# Patient Record
Sex: FEMALE | Race: ASIAN | NOT HISPANIC OR LATINO | Employment: FULL TIME | URBAN - METROPOLITAN AREA
[De-identification: names, ages, dates, MRNs, and addresses within clinical notes are randomized per-mention and may not be internally consistent; named-entity substitution may affect disease eponyms.]

---

## 2017-07-14 ENCOUNTER — HOSPITAL ENCOUNTER (OUTPATIENT)
Dept: LAB | Facility: MEDICAL CENTER | Age: 37
End: 2017-07-14
Attending: FAMILY MEDICINE
Payer: COMMERCIAL

## 2017-07-14 LAB — TSH SERPL DL<=0.005 MIU/L-ACNC: 5.82 UIU/ML (ref 0.3–3.7)

## 2017-07-14 PROCEDURE — 36415 COLL VENOUS BLD VENIPUNCTURE: CPT

## 2017-07-14 PROCEDURE — 84443 ASSAY THYROID STIM HORMONE: CPT

## 2018-11-21 ENCOUNTER — OFFICE VISIT (OUTPATIENT)
Dept: MEDICAL GROUP | Facility: PHYSICIAN GROUP | Age: 38
End: 2018-11-21
Payer: COMMERCIAL

## 2018-11-21 ENCOUNTER — HOSPITAL ENCOUNTER (OUTPATIENT)
Dept: LAB | Facility: MEDICAL CENTER | Age: 38
End: 2018-11-21
Attending: FAMILY MEDICINE
Payer: COMMERCIAL

## 2018-11-21 VITALS
SYSTOLIC BLOOD PRESSURE: 110 MMHG | OXYGEN SATURATION: 99 % | HEART RATE: 73 BPM | TEMPERATURE: 97.2 F | RESPIRATION RATE: 12 BRPM | BODY MASS INDEX: 34.23 KG/M2 | HEIGHT: 66 IN | DIASTOLIC BLOOD PRESSURE: 60 MMHG | WEIGHT: 213 LBS

## 2018-11-21 DIAGNOSIS — E03.4 HYPOTHYROIDISM DUE TO ACQUIRED ATROPHY OF THYROID: ICD-10-CM

## 2018-11-21 DIAGNOSIS — Z83.3 FAMILY HISTORY OF DIABETES MELLITUS: ICD-10-CM

## 2018-11-21 DIAGNOSIS — R53.83 OTHER FATIGUE: ICD-10-CM

## 2018-11-21 LAB
25(OH)D3 SERPL-MCNC: 9 NG/ML (ref 30–100)
ALBUMIN SERPL BCP-MCNC: 4.2 G/DL (ref 3.2–4.9)
ALBUMIN/GLOB SERPL: 1.3 G/DL
ALP SERPL-CCNC: 68 U/L (ref 30–99)
ALT SERPL-CCNC: 12 U/L (ref 2–50)
ANION GAP SERPL CALC-SCNC: 7 MMOL/L (ref 0–11.9)
AST SERPL-CCNC: 15 U/L (ref 12–45)
BASOPHILS # BLD AUTO: 0.5 % (ref 0–1.8)
BASOPHILS # BLD: 0.04 K/UL (ref 0–0.12)
BILIRUB SERPL-MCNC: 0.9 MG/DL (ref 0.1–1.5)
BUN SERPL-MCNC: 10 MG/DL (ref 8–22)
CALCIUM SERPL-MCNC: 9.1 MG/DL (ref 8.5–10.5)
CHLORIDE SERPL-SCNC: 102 MMOL/L (ref 96–112)
CHOLEST SERPL-MCNC: 200 MG/DL (ref 100–199)
CO2 SERPL-SCNC: 27 MMOL/L (ref 20–33)
CREAT SERPL-MCNC: 0.58 MG/DL (ref 0.5–1.4)
EOSINOPHIL # BLD AUTO: 0.09 K/UL (ref 0–0.51)
EOSINOPHIL NFR BLD: 1.1 % (ref 0–6.9)
ERYTHROCYTE [DISTWIDTH] IN BLOOD BY AUTOMATED COUNT: 43.8 FL (ref 35.9–50)
EST. AVERAGE GLUCOSE BLD GHB EST-MCNC: 123 MG/DL
FASTING STATUS PATIENT QL REPORTED: NORMAL
FOLATE SERPL-MCNC: 10.9 NG/ML
GLOBULIN SER CALC-MCNC: 3.2 G/DL (ref 1.9–3.5)
GLUCOSE SERPL-MCNC: 88 MG/DL (ref 65–99)
HBA1C MFR BLD: 5.9 % (ref 0–5.6)
HCT VFR BLD AUTO: 41.3 % (ref 37–47)
HDLC SERPL-MCNC: 59 MG/DL
HGB BLD-MCNC: 13 G/DL (ref 12–16)
IMM GRANULOCYTES # BLD AUTO: 0.02 K/UL (ref 0–0.11)
IMM GRANULOCYTES NFR BLD AUTO: 0.3 % (ref 0–0.9)
IRON SATN MFR SERPL: 14 % (ref 15–55)
IRON SERPL-MCNC: 68 UG/DL (ref 40–170)
LDLC SERPL CALC-MCNC: 124 MG/DL
LYMPHOCYTES # BLD AUTO: 2.2 K/UL (ref 1–4.8)
LYMPHOCYTES NFR BLD: 28.1 % (ref 22–41)
MCH RBC QN AUTO: 26.7 PG (ref 27–33)
MCHC RBC AUTO-ENTMCNC: 31.5 G/DL (ref 33.6–35)
MCV RBC AUTO: 85 FL (ref 81.4–97.8)
MONOCYTES # BLD AUTO: 0.45 K/UL (ref 0–0.85)
MONOCYTES NFR BLD AUTO: 5.7 % (ref 0–13.4)
NEUTROPHILS # BLD AUTO: 5.03 K/UL (ref 2–7.15)
NEUTROPHILS NFR BLD: 64.3 % (ref 44–72)
NRBC # BLD AUTO: 0 K/UL
NRBC BLD-RTO: 0 /100 WBC
PLATELET # BLD AUTO: 330 K/UL (ref 164–446)
PMV BLD AUTO: 11.4 FL (ref 9–12.9)
POTASSIUM SERPL-SCNC: 4.1 MMOL/L (ref 3.6–5.5)
PROT SERPL-MCNC: 7.4 G/DL (ref 6–8.2)
RBC # BLD AUTO: 4.86 M/UL (ref 4.2–5.4)
SODIUM SERPL-SCNC: 136 MMOL/L (ref 135–145)
T3 SERPL-MCNC: 108.8 NG/DL (ref 60–181)
T4 FREE SERPL-MCNC: 1.04 NG/DL (ref 0.53–1.43)
TIBC SERPL-MCNC: 473 UG/DL (ref 250–450)
TRIGL SERPL-MCNC: 86 MG/DL (ref 0–149)
TSH SERPL DL<=0.005 MIU/L-ACNC: 2.88 UIU/ML (ref 0.38–5.33)
WBC # BLD AUTO: 7.8 K/UL (ref 4.8–10.8)

## 2018-11-21 PROCEDURE — 83036 HEMOGLOBIN GLYCOSYLATED A1C: CPT

## 2018-11-21 PROCEDURE — 84443 ASSAY THYROID STIM HORMONE: CPT

## 2018-11-21 PROCEDURE — 36415 COLL VENOUS BLD VENIPUNCTURE: CPT

## 2018-11-21 PROCEDURE — 84480 ASSAY TRIIODOTHYRONINE (T3): CPT

## 2018-11-21 PROCEDURE — 80053 COMPREHEN METABOLIC PANEL: CPT

## 2018-11-21 PROCEDURE — 83550 IRON BINDING TEST: CPT

## 2018-11-21 PROCEDURE — 82746 ASSAY OF FOLIC ACID SERUM: CPT

## 2018-11-21 PROCEDURE — 85025 COMPLETE CBC W/AUTO DIFF WBC: CPT

## 2018-11-21 PROCEDURE — 80061 LIPID PANEL: CPT

## 2018-11-21 PROCEDURE — 84439 ASSAY OF FREE THYROXINE: CPT

## 2018-11-21 PROCEDURE — 99204 OFFICE O/P NEW MOD 45 MIN: CPT | Performed by: FAMILY MEDICINE

## 2018-11-21 PROCEDURE — 82306 VITAMIN D 25 HYDROXY: CPT

## 2018-11-21 PROCEDURE — 83540 ASSAY OF IRON: CPT

## 2018-11-21 RX ORDER — LEVOTHYROXINE SODIUM 0.05 MG/1
TABLET ORAL
COMMUNITY
Start: 2018-10-26 | End: 2019-04-23 | Stop reason: SDUPTHER

## 2018-11-21 ASSESSMENT — ENCOUNTER SYMPTOMS
CHILLS: 0
EYES NEGATIVE: 1
NEUROLOGICAL NEGATIVE: 1
PALPITATIONS: 0
TINGLING: 0
BLURRED VISION: 0
FEVER: 0
MYALGIAS: 0
HEARTBURN: 0
DEPRESSION: 0
HEADACHES: 0
RESPIRATORY NEGATIVE: 1
DIZZINESS: 0
ABDOMINAL PAIN: 0
PSYCHIATRIC NEGATIVE: 1
HEMOPTYSIS: 0
MUSCULOSKELETAL NEGATIVE: 1
NAUSEA: 0
BRUISES/BLEEDS EASILY: 0
GASTROINTESTINAL NEGATIVE: 1
DOUBLE VISION: 0
ANOREXIA: 0
FATIGUE: 1
CARDIOVASCULAR NEGATIVE: 1
COUGH: 0

## 2018-11-21 ASSESSMENT — PATIENT HEALTH QUESTIONNAIRE - PHQ9: CLINICAL INTERPRETATION OF PHQ2 SCORE: 0

## 2018-11-21 NOTE — PROGRESS NOTES
Subjective:      Asmita Vick is a 38 y.o. female who presents with Thyroid Problem            New patient visit, has been very tired lately and no recent labs for her thyroid replacement will get labs for that and for her family history of DM    1. Hypothyroidism due to acquired atrophy of thyroid    - levothyroxine (SYNTHROID) 50 MCG Tab;   - COMP METABOLIC PANEL; Future  - FREE THYROXINE; Future  - HEMOGLOBIN A1C; Future  - Lipid Profile; Future  - TRIIDOTHYRONINE; Future  - TSH; Future  - VITAMIN D,25 HYDROXY; Future  - CBC WITH DIFFERENTIAL; Future  - IRON/TOTAL IRON BIND; Future  - FOLATE; Future    2. Other fatigue    - levothyroxine (SYNTHROID) 50 MCG Tab;   - COMP METABOLIC PANEL; Future  - FREE THYROXINE; Future  - HEMOGLOBIN A1C; Future  - Lipid Profile; Future  - TRIIDOTHYRONINE; Future  - TSH; Future  - VITAMIN D,25 HYDROXY; Future  - CBC WITH DIFFERENTIAL; Future  - IRON/TOTAL IRON BIND; Future  - FOLATE; Future    3. Family history of diabetes mellitus    - levothyroxine (SYNTHROID) 50 MCG Tab;   - COMP METABOLIC PANEL; Future  - FREE THYROXINE; Future  - HEMOGLOBIN A1C; Future  - Lipid Profile; Future  - TRIIDOTHYRONINE; Future  - TSH; Future  - VITAMIN D,25 HYDROXY; Future  - CBC WITH DIFFERENTIAL; Future  - IRON/TOTAL IRON BIND; Future  - FOLATE; Future    Past Medical History:  2/6/2013: Abnormal skin color  No date: ASTHMA      Comment:  in childhood only, none recent  2/6/2013: Family history of diabetes mellitus  No date: Thyroid disease  History reviewed. No pertinent surgical history.  Smoking status: Never Smoker                                                              Smokeless tobacco: Never Used                      Alcohol use: No              Review of patient's family history indicates:  Problem: Hypertension      Relation: Mother       Age of Onset: (Not Specified)   Problem: Diabetes      Relation: Mother       Age of Onset: (Not Specified)   Problem: Diabetes       Relation: Father       Age of Onset: (Not Specified)   Problem: Hypertension      Relation: Father       Age of Onset: (Not Specified)       Current Outpatient Prescriptions: •  levothyroxine (SYNTHROID) 50 MCG Tab, , Disp: , Rfl: •  ibuprofen (MOTRIN) 600 MG TABS, Take 1 Tab by mouth every 6 hours as needed (Cramping). (Patient not taking: Reported on 11/21/2018), Disp: 20 Tab, Rfl: 1•  acetaminophen (TYLENOL) 325 MG TABS, Take 1 Tab by mouth every four hours as needed for Mild Pain ((Pain Scale 1-3))., Disp: 30 Tab, Rfl: 0•  acetaminophen 650 MG TABS, Take 650 mg by mouth every four hours as needed for Fever (over 100.4 F)., Disp: 30 Tab, Rfl: 0•  oxycodone-acetaminophen (PERCOCET) 5-325 MG TABS, Take 1-2 Tabs by mouth every four hours as needed for Severe Pain ((Pain Scale 7-10)). (Patient not taking: Reported on 11/21/2018), Disp: 15 Tab, Rfl: 0•  Prenatal MV-Min-Fe Fum-FA-DHA (PRENATAL 1 PO), Take  by mouth., Disp: , Rfl: •  albuterol (PROVENTIL) 2.5mg/3ml NEBU, 3 mL by Nebulization route every 6 hours as needed for Shortness of Breath., Disp: 25 Bullet, Rfl: 1    Patient was instructed on the use of medications, either prescriptions or OTC and informed on when the appropriate follow up time period should be. In addition, patient was also instructed that should any acute worsening occur that they should notify this clinic asap or call 911.          Fatigue   This is a chronic problem. The current episode started more than 1 month ago. The problem occurs constantly. The problem has been waxing and waning. Associated symptoms include fatigue. Pertinent negatives include no abdominal pain, anorexia, chest pain, chills, coughing, fever, headaches, myalgias, nausea or rash. The symptoms are aggravated by exertion. She has tried rest for the symptoms. The treatment provided no relief.       Review of Systems   Constitutional: Positive for fatigue and malaise/fatigue. Negative for chills and fever.   HENT: Negative.   "Negative for hearing loss.    Eyes: Negative.  Negative for blurred vision and double vision.   Respiratory: Negative.  Negative for cough and hemoptysis.    Cardiovascular: Negative.  Negative for chest pain and palpitations.   Gastrointestinal: Negative.  Negative for abdominal pain, anorexia, heartburn and nausea.   Genitourinary: Negative.  Negative for dysuria.   Musculoskeletal: Negative.  Negative for myalgias.   Skin: Negative.  Negative for rash.   Neurological: Negative.  Negative for dizziness, tingling and headaches.   Endo/Heme/Allergies: Negative.  Does not bruise/bleed easily.   Psychiatric/Behavioral: Negative.  Negative for depression and suicidal ideas.   All other systems reviewed and are negative.         Objective:     /60 (BP Location: Left arm)   Pulse 73   Temp 36.2 °C (97.2 °F)   Resp 12   Ht 1.676 m (5' 6\")   Wt 96.6 kg (213 lb)   SpO2 99%   BMI 34.38 kg/m²      Physical Exam   Constitutional: She is oriented to person, place, and time. She appears well-developed and well-nourished. No distress.   HENT:   Head: Normocephalic and atraumatic.   Mouth/Throat: Oropharynx is clear and moist. No oropharyngeal exudate.   Eyes: Pupils are equal, round, and reactive to light.   Cardiovascular: Normal rate, regular rhythm, normal heart sounds and intact distal pulses.  Exam reveals no gallop and no friction rub.    No murmur heard.  Pulmonary/Chest: Effort normal and breath sounds normal. No respiratory distress. She has no wheezes. She has no rales. She exhibits no tenderness.   Neurological: She is alert and oriented to person, place, and time.   Skin: She is not diaphoretic.   Psychiatric: She has a normal mood and affect. Her behavior is normal. Judgment and thought content normal.   Nursing note and vitals reviewed.              Assessment/Plan:     1. Hypothyroidism due to acquired atrophy of thyroid    - levothyroxine (SYNTHROID) 50 MCG Tab;   - COMP METABOLIC PANEL; Future  - " FREE THYROXINE; Future  - HEMOGLOBIN A1C; Future  - Lipid Profile; Future  - TRIIDOTHYRONINE; Future  - TSH; Future  - VITAMIN D,25 HYDROXY; Future  - CBC WITH DIFFERENTIAL; Future  - IRON/TOTAL IRON BIND; Future  - FOLATE; Future    2. Other fatigue    - levothyroxine (SYNTHROID) 50 MCG Tab;   - COMP METABOLIC PANEL; Future  - FREE THYROXINE; Future  - HEMOGLOBIN A1C; Future  - Lipid Profile; Future  - TRIIDOTHYRONINE; Future  - TSH; Future  - VITAMIN D,25 HYDROXY; Future  - CBC WITH DIFFERENTIAL; Future  - IRON/TOTAL IRON BIND; Future  - FOLATE; Future    3. Family history of diabetes mellitus    - levothyroxine (SYNTHROID) 50 MCG Tab;   - COMP METABOLIC PANEL; Future  - FREE THYROXINE; Future  - HEMOGLOBIN A1C; Future  - Lipid Profile; Future  - TRIIDOTHYRONINE; Future  - TSH; Future  - VITAMIN D,25 HYDROXY; Future  - CBC WITH DIFFERENTIAL; Future  - IRON/TOTAL IRON BIND; Future  - FOLATE; Future

## 2018-11-26 ENCOUNTER — TELEPHONE (OUTPATIENT)
Dept: MEDICAL GROUP | Facility: PHYSICIAN GROUP | Age: 38
End: 2018-11-26

## 2018-11-26 NOTE — TELEPHONE ENCOUNTER
----- Message from Mahamed Abebe M.D. sent at 11/26/2018  8:12 AM PST -----  This patient needs an appointment within the next week. Please schedule this with the patient so we may discuss their lab results

## 2018-11-29 ENCOUNTER — OFFICE VISIT (OUTPATIENT)
Dept: MEDICAL GROUP | Facility: PHYSICIAN GROUP | Age: 38
End: 2018-11-29
Payer: COMMERCIAL

## 2018-11-29 VITALS
SYSTOLIC BLOOD PRESSURE: 104 MMHG | HEIGHT: 66 IN | OXYGEN SATURATION: 99 % | HEART RATE: 90 BPM | BODY MASS INDEX: 34.87 KG/M2 | WEIGHT: 217 LBS | RESPIRATION RATE: 16 BRPM | DIASTOLIC BLOOD PRESSURE: 70 MMHG | TEMPERATURE: 97.3 F

## 2018-11-29 DIAGNOSIS — E55.9 VITAMIN D DEFICIENCY: ICD-10-CM

## 2018-11-29 DIAGNOSIS — E74.39 GLUCOSE INTOLERANCE: ICD-10-CM

## 2018-11-29 PROCEDURE — 99213 OFFICE O/P EST LOW 20 MIN: CPT | Performed by: FAMILY MEDICINE

## 2018-11-30 NOTE — PROGRESS NOTES
Over 50% of this 15 minute visit was spent on counseling and coordination of care regarding the patient's current problem of   1. Glucose intolerance  Due to the patient's issues with glucose management, today they were extensively counseled on a low carb diet and exercise and losing weight    - VITAMIN D,25 HYDROXY; Future  - HEMOGLOBIN A1C; Future  - COMP METABOLIC PANEL; Future  - Lipid Profile; Future    2. Vitamin D deficiency  Labs show patient is low on vitamin d, they needs to replace this with 2000 units per day otc    - VITAMIN D,25 HYDROXY; Future  - HEMOGLOBIN A1C; Future  - COMP METABOLIC PANEL; Future  - Lipid Profile; Future    Past Medical History:   Diagnosis Date   • Abnormal skin color 2/6/2013   • ASTHMA     in childhood only, none recent   • Family history of diabetes mellitus 2/6/2013   • Thyroid disease      No past surgical history on file.  Social History   Substance Use Topics   • Smoking status: Never Smoker   • Smokeless tobacco: Never Used   • Alcohol use No     Family History   Problem Relation Age of Onset   • Hypertension Mother    • Diabetes Mother    • Diabetes Father    • Hypertension Father          Current Outpatient Prescriptions:   •  levothyroxine (SYNTHROID) 50 MCG Tab, , Disp: , Rfl:   •  ibuprofen (MOTRIN) 600 MG TABS, Take 1 Tab by mouth every 6 hours as needed (Cramping). (Patient not taking: Reported on 11/21/2018), Disp: 20 Tab, Rfl: 1  •  acetaminophen (TYLENOL) 325 MG TABS, Take 1 Tab by mouth every four hours as needed for Mild Pain ((Pain Scale 1-3))., Disp: 30 Tab, Rfl: 0  •  acetaminophen 650 MG TABS, Take 650 mg by mouth every four hours as needed for Fever (over 100.4 F)., Disp: 30 Tab, Rfl: 0  •  oxycodone-acetaminophen (PERCOCET) 5-325 MG TABS, Take 1-2 Tabs by mouth every four hours as needed for Severe Pain ((Pain Scale 7-10)). (Patient not taking: Reported on 11/21/2018), Disp: 15 Tab, Rfl: 0  •  Prenatal MV-Min-Fe Fum-FA-DHA (PRENATAL 1 PO), Take  by  mouth., Disp: , Rfl:   •  albuterol (PROVENTIL) 2.5mg/3ml NEBU, 3 mL by Nebulization route every 6 hours as needed for Shortness of Breath., Disp: 25 Bullet, Rfl: 1    Patient was instructed on the use of medications, either prescriptions or OTC and informed on when the appropriate follow up time period should be. In addition, patient was also instructed that should any acute worsening occur that they should notify this clinic asap or call 911.

## 2019-04-23 ENCOUNTER — HOSPITAL ENCOUNTER (OUTPATIENT)
Dept: LAB | Facility: MEDICAL CENTER | Age: 39
End: 2019-04-23
Attending: FAMILY MEDICINE
Payer: COMMERCIAL

## 2019-04-23 DIAGNOSIS — E74.39 GLUCOSE INTOLERANCE: ICD-10-CM

## 2019-04-23 DIAGNOSIS — Z83.3 FAMILY HISTORY OF DIABETES MELLITUS: ICD-10-CM

## 2019-04-23 DIAGNOSIS — E55.9 VITAMIN D DEFICIENCY: ICD-10-CM

## 2019-04-23 DIAGNOSIS — R53.83 OTHER FATIGUE: ICD-10-CM

## 2019-04-23 DIAGNOSIS — E03.4 HYPOTHYROIDISM DUE TO ACQUIRED ATROPHY OF THYROID: ICD-10-CM

## 2019-04-23 LAB
25(OH)D3 SERPL-MCNC: 33 NG/ML (ref 30–100)
ALBUMIN SERPL BCP-MCNC: 4.4 G/DL (ref 3.2–4.9)
ALBUMIN/GLOB SERPL: 1.4 G/DL
ALP SERPL-CCNC: 59 U/L (ref 30–99)
ALT SERPL-CCNC: 10 U/L (ref 2–50)
ANION GAP SERPL CALC-SCNC: 8 MMOL/L (ref 0–11.9)
AST SERPL-CCNC: 16 U/L (ref 12–45)
BILIRUB SERPL-MCNC: 1.2 MG/DL (ref 0.1–1.5)
BUN SERPL-MCNC: 10 MG/DL (ref 8–22)
CALCIUM SERPL-MCNC: 9.4 MG/DL (ref 8.5–10.5)
CHLORIDE SERPL-SCNC: 102 MMOL/L (ref 96–112)
CHOLEST SERPL-MCNC: 191 MG/DL (ref 100–199)
CO2 SERPL-SCNC: 27 MMOL/L (ref 20–33)
CREAT SERPL-MCNC: 0.72 MG/DL (ref 0.5–1.4)
FASTING STATUS PATIENT QL REPORTED: NORMAL
GLOBULIN SER CALC-MCNC: 3.1 G/DL (ref 1.9–3.5)
GLUCOSE SERPL-MCNC: 88 MG/DL (ref 65–99)
HDLC SERPL-MCNC: 54 MG/DL
LDLC SERPL CALC-MCNC: 119 MG/DL
POTASSIUM SERPL-SCNC: 4.6 MMOL/L (ref 3.6–5.5)
PROT SERPL-MCNC: 7.5 G/DL (ref 6–8.2)
SODIUM SERPL-SCNC: 137 MMOL/L (ref 135–145)
TRIGL SERPL-MCNC: 92 MG/DL (ref 0–149)

## 2019-04-23 PROCEDURE — 80053 COMPREHEN METABOLIC PANEL: CPT

## 2019-04-23 PROCEDURE — 36415 COLL VENOUS BLD VENIPUNCTURE: CPT

## 2019-04-23 PROCEDURE — 83036 HEMOGLOBIN GLYCOSYLATED A1C: CPT

## 2019-04-23 PROCEDURE — 82306 VITAMIN D 25 HYDROXY: CPT

## 2019-04-23 PROCEDURE — 80061 LIPID PANEL: CPT

## 2019-04-23 RX ORDER — LEVOTHYROXINE SODIUM 0.05 MG/1
50 TABLET ORAL
Qty: 90 TAB | Refills: 0 | Status: SHIPPED | OUTPATIENT
Start: 2019-04-23 | End: 2021-03-10

## 2019-04-24 LAB
EST. AVERAGE GLUCOSE BLD GHB EST-MCNC: 123 MG/DL
HBA1C MFR BLD: 5.9 % (ref 0–5.6)

## 2019-05-08 ENCOUNTER — GYNECOLOGY VISIT (OUTPATIENT)
Dept: OBGYN | Facility: MEDICAL CENTER | Age: 39
End: 2019-05-08
Payer: COMMERCIAL

## 2019-05-08 VITALS — WEIGHT: 213 LBS | BODY MASS INDEX: 34.38 KG/M2 | DIASTOLIC BLOOD PRESSURE: 68 MMHG | SYSTOLIC BLOOD PRESSURE: 108 MMHG

## 2019-05-08 DIAGNOSIS — N64.4 MASTALGIA: ICD-10-CM

## 2019-05-08 DIAGNOSIS — Z12.39 BREAST CANCER SCREENING: ICD-10-CM

## 2019-05-08 PROCEDURE — 99203 OFFICE O/P NEW LOW 30 MIN: CPT | Performed by: OBSTETRICS & GYNECOLOGY

## 2019-05-09 NOTE — PROGRESS NOTES
Subjective:      Asmita Vick is a 38 y.o. female who presents with No chief complaint on file.    CC: boils in vaginal area        HPI: 39 yo  lmp 19 using condoms for contraception presents with complaint of boils in the vaginal area x 2 months.  She states they are not big, less than a quarter size, and itchy.  No pain or vaginal discharge.  Denies hx of abnormal pap smears or stds.  Denies dysuria.  She is currently on her menses and does not want to have a pelvic exam.  She also complains of left sided breast pain, which has been present for several months and is localized to the inferior aspect of her left breast. She describes the pain as low grade and constant. Denies skin changes, nipple discharge, or palpable mass.  She drinks about 4 cups of tea per day. No family hx of breast cancer.     ROS REVIEW OF SYSTEMS:    Pertinent positives and negatives mentioned in HPI.    All other systems reviewed and are negative or noncontributory.       Objective:     /68   Wt 96.6 kg (213 lb)   LMP 2019 (Exact Date)   Breastfeeding? Unknown   BMI 34.38 kg/m²      Physical Exam      GENERAL: Alert, in no apparent distress  PSYCHIATRIC: Appropriate affect, intact insight and judgement.  NECK:  Nontender, no masses.  No Thyromegaly or nodules. No lymphadenopathy.  RESPIRATORY: Normal respiratory effort.  Lungs clear to auscultation.   CARDIOVASCULAR: RRR, no murmur, gallop, or rub.  ABDOMEN: Soft, nontender, nondistended.  No palpable masses.  No rebound or guarding.  No inguinal lymphadenopathy.  No hepatosplenomegaly.  No hernias.  BACK: No CVA tenderness  EXTREMITIES: No edema  SKIN: No rash    BREAST: No masses or tenderness.  No skin changes.  No nipple inversion or discharge. No axillary lymphadenopathy.      GENITOURINARY: refuses pelvic exam today because she is on her menses.     Assessment/Plan:     1. Mastalgia  Normal breast exam, but given unilateral pain, will obtain  mammogram.  Discussed reducing caffeine intake, and symptomatic treatment with Motrin and oil of evening primrose.   - MA-SCREENING MAMMO BILAT W/TOMOSYNTHESIS W/CAD; Future    2. Breast cancer screening  - MA-SCREENING MAMMO BILAT W/TOMOSYNTHESIS W/CAD; Future    F/U 2 weeks for annual exam and evaluation of vaginal boils.

## 2019-05-24 ENCOUNTER — APPOINTMENT (OUTPATIENT)
Dept: OBGYN | Facility: MEDICAL CENTER | Age: 39
End: 2019-05-24
Payer: COMMERCIAL

## 2019-05-24 ENCOUNTER — HOSPITAL ENCOUNTER (OUTPATIENT)
Dept: RADIOLOGY | Facility: MEDICAL CENTER | Age: 39
End: 2019-05-24
Attending: OBSTETRICS & GYNECOLOGY
Payer: COMMERCIAL

## 2019-05-24 DIAGNOSIS — Z12.39 BREAST CANCER SCREENING: ICD-10-CM

## 2019-05-24 DIAGNOSIS — N64.4 MASTALGIA: ICD-10-CM

## 2019-05-24 PROCEDURE — G0279 TOMOSYNTHESIS, MAMMO: HCPCS

## 2019-05-24 PROCEDURE — 76642 ULTRASOUND BREAST LIMITED: CPT | Mod: RT

## 2019-05-30 ENCOUNTER — HOSPITAL ENCOUNTER (OUTPATIENT)
Dept: LAB | Facility: MEDICAL CENTER | Age: 39
End: 2019-05-30
Attending: SPECIALIST
Payer: COMMERCIAL

## 2019-05-30 PROCEDURE — 87086 URINE CULTURE/COLONY COUNT: CPT

## 2019-06-01 LAB
BACTERIA UR CULT: NORMAL
SIGNIFICANT IND 70042: NORMAL
SITE SITE: NORMAL
SOURCE SOURCE: NORMAL

## 2020-05-27 ENCOUNTER — TELEMEDICINE (OUTPATIENT)
Dept: MEDICAL GROUP | Facility: PHYSICIAN GROUP | Age: 40
End: 2020-05-27
Payer: COMMERCIAL

## 2020-05-27 VITALS — WEIGHT: 200 LBS | BODY MASS INDEX: 32.28 KG/M2

## 2020-05-27 DIAGNOSIS — S46.812A TRAPEZIUS STRAIN, LEFT, INITIAL ENCOUNTER: ICD-10-CM

## 2020-05-27 DIAGNOSIS — E74.39 GLUCOSE INTOLERANCE: ICD-10-CM

## 2020-05-27 DIAGNOSIS — E03.4 HYPOTHYROIDISM DUE TO ACQUIRED ATROPHY OF THYROID: ICD-10-CM

## 2020-05-27 PROCEDURE — 99214 OFFICE O/P EST MOD 30 MIN: CPT | Mod: 95,CR | Performed by: FAMILY MEDICINE

## 2020-05-27 RX ORDER — TIZANIDINE 4 MG/1
4 TABLET ORAL NIGHTLY PRN
Qty: 30 TAB | Refills: 1 | Status: SHIPPED | OUTPATIENT
Start: 2020-05-27 | End: 2021-03-10

## 2020-05-27 ASSESSMENT — FIBROSIS 4 INDEX: FIB4 SCORE: 0.61

## 2020-05-27 NOTE — PROGRESS NOTES
Telemedicine Visit: Established Patient     This encounter was conducted via Zoom .   Verbal consent was obtained. Patient's identity was verified.    Subjective:   CC: neck pain  Asmita Vick is a 39 y.o. female presenting for evaluation and management of:    Pain in the left neck and shoulder for the past week, worse with use, has tried otc patches with some relief. On exam FROM present    ROS   Denies any recent fevers or chills. No nausea or vomiting. No chest pains or shortness of breath.     Allergies   Allergen Reactions   • Nkda [No Known Drug Allergy]        Current medicines (including changes today)  Current Outpatient Medications   Medication Sig Dispense Refill   • tizanidine (ZANAFLEX) 4 MG Tab Take 1 Tab by mouth at bedtime as needed. 30 Tab 1   • levothyroxine (SYNTHROID) 50 MCG Tab Take 1 Tab by mouth Every morning on an empty stomach. 90 Tab 0   • ibuprofen (MOTRIN) 600 MG TABS Take 1 Tab by mouth every 6 hours as needed (Cramping). 20 Tab 1   • acetaminophen 650 MG TABS Take 650 mg by mouth every four hours as needed for Fever (over 100.4 F). 30 Tab 0   • acetaminophen (TYLENOL) 325 MG TABS Take 1 Tab by mouth every four hours as needed for Mild Pain ((Pain Scale 1-3)). 30 Tab 0   • oxycodone-acetaminophen (PERCOCET) 5-325 MG TABS Take 1-2 Tabs by mouth every four hours as needed for Severe Pain ((Pain Scale 7-10)). (Patient not taking: Reported on 11/21/2018) 15 Tab 0   • Prenatal MV-Min-Fe Fum-FA-DHA (PRENATAL 1 PO) Take  by mouth.     • albuterol (PROVENTIL) 2.5mg/3ml NEBU 3 mL by Nebulization route every 6 hours as needed for Shortness of Breath. (Patient not taking: Reported on 5/8/2019) 25 Bullet 1     No current facility-administered medications for this visit.        Patient Active Problem List    Diagnosis Date Noted   • Hypothyroidism due to acquired atrophy of thyroid 11/21/2018   • Family history of diabetes mellitus 02/06/2013       Family History   Problem Relation Age of  Onset   • Hypertension Mother    • Diabetes Mother    • Diabetes Father    • Hypertension Father        She  has a past medical history of Abnormal skin color (2/6/2013), ASTHMA, Family history of diabetes mellitus (2/6/2013), and Thyroid disease.  She  has no past surgical history on file.       Objective:   Vitals obtained by patient:  There were no vitals filed for this visit.      Physical Exam  Constitutional: Alert, no distress, well-groomed.  Skin: No rashes in visible areas.  Eye: Round. Conjunctiva clear, lids normal. No icterus.   ENMT: Lips pink without lesions, good dentition, moist mucous membranes. Phonation normal.  Neck: No masses, no thyromegaly. Moves freely without pain.  CV: Pulse as reported by patient  Respiratory: Unlabored respiratory effort, no cough or audible wheeze  Psych: Alert and oriented x3, normal affect and mood.       Assessment and Plan:   The following treatment plan was discussed:     1. Trapezius strain, left, initial encounter    2. Hypothyroidism due to acquired atrophy of thyroid  - FREE THYROXINE; Future  - TRIIDOTHYRONINE; Future  - TSH; Future  - Comp Metabolic Panel; Future    3. Glucose intolerance  - HEMOGLOBIN A1C; Future    Other orders  - tizanidine (ZANAFLEX) 4 MG Tab; Take 1 Tab by mouth at bedtime as needed.  Dispense: 30 Tab; Refill: 1        Follow-up: No follow-ups on file.

## 2020-06-30 ENCOUNTER — HOSPITAL ENCOUNTER (OUTPATIENT)
Dept: LAB | Facility: MEDICAL CENTER | Age: 40
End: 2020-06-30
Attending: FAMILY MEDICINE
Payer: COMMERCIAL

## 2020-06-30 DIAGNOSIS — E74.39 GLUCOSE INTOLERANCE: ICD-10-CM

## 2020-06-30 DIAGNOSIS — E03.4 HYPOTHYROIDISM DUE TO ACQUIRED ATROPHY OF THYROID: ICD-10-CM

## 2020-06-30 LAB
ALBUMIN SERPL BCP-MCNC: 4.2 G/DL (ref 3.2–4.9)
ALBUMIN/GLOB SERPL: 1.3 G/DL
ALP SERPL-CCNC: 61 U/L (ref 30–99)
ALT SERPL-CCNC: 15 U/L (ref 2–50)
ANION GAP SERPL CALC-SCNC: 10 MMOL/L (ref 7–16)
AST SERPL-CCNC: 13 U/L (ref 12–45)
BILIRUB SERPL-MCNC: 1.1 MG/DL (ref 0.1–1.5)
BUN SERPL-MCNC: 7 MG/DL (ref 8–22)
CALCIUM SERPL-MCNC: 9.5 MG/DL (ref 8.4–10.2)
CHLORIDE SERPL-SCNC: 103 MMOL/L (ref 96–112)
CO2 SERPL-SCNC: 25 MMOL/L (ref 20–33)
CREAT SERPL-MCNC: 0.63 MG/DL (ref 0.5–1.4)
EST. AVERAGE GLUCOSE BLD GHB EST-MCNC: 123 MG/DL
GLOBULIN SER CALC-MCNC: 3.3 G/DL (ref 1.9–3.5)
GLUCOSE SERPL-MCNC: 84 MG/DL (ref 65–99)
HBA1C MFR BLD: 5.9 % (ref 0–5.6)
POTASSIUM SERPL-SCNC: 4.1 MMOL/L (ref 3.6–5.5)
PROT SERPL-MCNC: 7.5 G/DL (ref 6–8.2)
SODIUM SERPL-SCNC: 138 MMOL/L (ref 135–145)
T3 SERPL-MCNC: 105 NG/DL (ref 60–181)
T4 FREE SERPL-MCNC: 1.05 NG/DL (ref 0.93–1.7)
TSH SERPL DL<=0.005 MIU/L-ACNC: 0.59 UIU/ML (ref 0.38–5.33)

## 2020-06-30 PROCEDURE — 84439 ASSAY OF FREE THYROXINE: CPT

## 2020-06-30 PROCEDURE — 84443 ASSAY THYROID STIM HORMONE: CPT

## 2020-06-30 PROCEDURE — 84480 ASSAY TRIIODOTHYRONINE (T3): CPT

## 2020-06-30 PROCEDURE — 36415 COLL VENOUS BLD VENIPUNCTURE: CPT

## 2020-06-30 PROCEDURE — 83036 HEMOGLOBIN GLYCOSYLATED A1C: CPT

## 2020-06-30 PROCEDURE — 80053 COMPREHEN METABOLIC PANEL: CPT

## 2020-07-14 ENCOUNTER — OFFICE VISIT (OUTPATIENT)
Dept: MEDICAL GROUP | Facility: PHYSICIAN GROUP | Age: 40
End: 2020-07-14
Payer: COMMERCIAL

## 2020-07-14 VITALS
HEIGHT: 66 IN | BODY MASS INDEX: 36.03 KG/M2 | HEART RATE: 71 BPM | WEIGHT: 224.2 LBS | TEMPERATURE: 98 F | OXYGEN SATURATION: 96 % | SYSTOLIC BLOOD PRESSURE: 116 MMHG | DIASTOLIC BLOOD PRESSURE: 74 MMHG | RESPIRATION RATE: 16 BRPM

## 2020-07-14 DIAGNOSIS — M25.561 PAIN IN BOTH KNEES, UNSPECIFIED CHRONICITY: ICD-10-CM

## 2020-07-14 DIAGNOSIS — R60.0 PEDAL EDEMA: ICD-10-CM

## 2020-07-14 DIAGNOSIS — E74.39 GLUCOSE INTOLERANCE: ICD-10-CM

## 2020-07-14 DIAGNOSIS — E03.4 HYPOTHYROIDISM DUE TO ACQUIRED ATROPHY OF THYROID: ICD-10-CM

## 2020-07-14 DIAGNOSIS — E66.9 OBESITY (BMI 35.0-39.9 WITHOUT COMORBIDITY): ICD-10-CM

## 2020-07-14 DIAGNOSIS — M25.562 PAIN IN BOTH KNEES, UNSPECIFIED CHRONICITY: ICD-10-CM

## 2020-07-14 PROCEDURE — 99214 OFFICE O/P EST MOD 30 MIN: CPT | Performed by: FAMILY MEDICINE

## 2020-07-14 RX ORDER — FUROSEMIDE 20 MG/1
20 TABLET ORAL DAILY
Qty: 30 TAB | Refills: 1 | Status: SHIPPED | OUTPATIENT
Start: 2020-07-14 | End: 2022-07-18

## 2020-07-14 ASSESSMENT — PATIENT HEALTH QUESTIONNAIRE - PHQ9: CLINICAL INTERPRETATION OF PHQ2 SCORE: 0

## 2020-07-14 ASSESSMENT — FIBROSIS 4 INDEX: FIB4 SCORE: 0.41

## 2020-07-15 ASSESSMENT — ENCOUNTER SYMPTOMS
BLURRED VISION: 0
PSYCHIATRIC NEGATIVE: 1
EYES NEGATIVE: 1
COUGH: 0
NEUROLOGICAL NEGATIVE: 1
CONSTITUTIONAL NEGATIVE: 1
DEPRESSION: 0
GASTROINTESTINAL NEGATIVE: 1
FEVER: 0
HEADACHES: 0
DIZZINESS: 0
CHILLS: 0
HEMOPTYSIS: 0
NAUSEA: 0
MUSCULOSKELETAL NEGATIVE: 1
HEARTBURN: 0
MYALGIAS: 0
TINGLING: 0
PALPITATIONS: 0
BRUISES/BLEEDS EASILY: 0
DOUBLE VISION: 0
RESPIRATORY NEGATIVE: 1

## 2020-07-15 NOTE — PROGRESS NOTES
Subjective:      Asmita Vick is a 39 y.o. female who presents with Edema (feet and ankles bilateral) and Numbness (fingers)            1. Hypothyroidism due to acquired atrophy of thyroid  Patient is being treated for hypothyroidism, currently taking meds, no symptoms of fast or slow heartbeat and energy level steady. No new hair loss or skin symptoms. Labs have been checked in past year and are stable on current dose.  controlled      2. Pedal edema  Some increase since working from home and sitting more, advised on more frequent movement, will try low dose lasix prn  - furosemide (LASIX) 20 MG Tab; Take 1 Tab by mouth every day.  Dispense: 30 Tab; Refill: 1    3. Obesity (BMI 35.0-39.9 without comorbidity)  Patient has a diagnosis of obesity. They were counseled today on increasing exercise and  extensively counseled on a low carb diet       4. Glucose intolerance  Due to the patient's issues with glucose management, today they were extensively counseled on a low carb diet and exercise and losing weight      Past Medical History:  2/6/2013: Abnormal skin color  No date: ASTHMA      Comment:  in childhood only, none recent  2/6/2013: Family history of diabetes mellitus  No date: Thyroid disease  History reviewed. No pertinent surgical history.  Social History    Tobacco Use      Smoking status: Never Smoker      Smokeless tobacco: Never Used    Alcohol use: No    Drug use: No    Review of patient's family history indicates:  Problem: Hypertension      Relation: Mother          Age of Onset: (Not Specified)  Problem: Diabetes      Relation: Mother          Age of Onset: (Not Specified)  Problem: Diabetes      Relation: Father          Age of Onset: (Not Specified)  Problem: Hypertension      Relation: Father          Age of Onset: (Not Specified)      Current Outpatient Medications: •  furosemide (LASIX) 20 MG Tab, Take 1 Tab by mouth every day., Disp: 30 Tab, Rfl: 1•  levothyroxine (SYNTHROID) 50 MCG  Tab, Take 1 Tab by mouth Every morning on an empty stomach., Disp: 90 Tab, Rfl: 0•  acetaminophen (TYLENOL) 325 MG TABS, Take 1 Tab by mouth every four hours as needed for Mild Pain ((Pain Scale 1-3))., Disp: 30 Tab, Rfl: 0•  tizanidine (ZANAFLEX) 4 MG Tab, Take 1 Tab by mouth at bedtime as needed. (Patient not taking: Reported on 7/14/2020), Disp: 30 Tab, Rfl: 1•  ibuprofen (MOTRIN) 600 MG TABS, Take 1 Tab by mouth every 6 hours as needed (Cramping). (Patient not taking: Reported on 7/14/2020), Disp: 20 Tab, Rfl: 1•  acetaminophen 650 MG TABS, Take 650 mg by mouth every four hours as needed for Fever (over 100.4 F)., Disp: 30 Tab, Rfl: 0•  oxycodone-acetaminophen (PERCOCET) 5-325 MG TABS, Take 1-2 Tabs by mouth every four hours as needed for Severe Pain ((Pain Scale 7-10)). (Patient not taking: Reported on 11/21/2018), Disp: 15 Tab, Rfl: 0•  Prenatal MV-Min-Fe Fum-FA-DHA (PRENATAL 1 PO), Take  by mouth., Disp: , Rfl: •  albuterol (PROVENTIL) 2.5mg/3ml NEBU, 3 mL by Nebulization route every 6 hours as needed for Shortness of Breath. (Patient not taking: Reported on 5/8/2019), Disp: 25 Bullet, Rfl: 1    Patient was instructed on the use of medications, either prescriptions or OTC and informed on when the appropriate follow up time period should be. In addition, patient was also instructed that should any acute worsening occur that they should notify this clinic asap or call 911.          Review of Systems   Constitutional: Negative.  Negative for chills and fever.   HENT: Negative.  Negative for hearing loss.    Eyes: Negative.  Negative for blurred vision and double vision.   Respiratory: Negative.  Negative for cough and hemoptysis.    Cardiovascular: Positive for leg swelling. Negative for chest pain and palpitations.   Gastrointestinal: Negative.  Negative for heartburn and nausea.   Genitourinary: Negative.  Negative for dysuria.   Musculoskeletal: Negative.  Negative for myalgias.   Skin: Negative.  Negative  "for rash.   Neurological: Negative.  Negative for dizziness, tingling and headaches.   Endo/Heme/Allergies: Negative.  Does not bruise/bleed easily.   Psychiatric/Behavioral: Negative.  Negative for depression and suicidal ideas.   All other systems reviewed and are negative.         Objective:     /74 (BP Location: Left arm, Patient Position: Sitting)   Pulse 71   Temp 36.7 °C (98 °F) (Tympanic)   Resp 16   Ht 1.676 m (5' 6\")   Wt 101.7 kg (224 lb 3.2 oz)   SpO2 96%   BMI 36.19 kg/m²      Physical Exam  Vitals signs and nursing note reviewed.   Constitutional:       General: She is not in acute distress.     Appearance: She is well-developed. She is not diaphoretic.   HENT:      Head: Normocephalic and atraumatic.      Mouth/Throat:      Pharynx: No oropharyngeal exudate.   Eyes:      Pupils: Pupils are equal, round, and reactive to light.   Cardiovascular:      Rate and Rhythm: Normal rate and regular rhythm.      Heart sounds: Normal heart sounds. No murmur. No friction rub. No gallop.    Pulmonary:      Effort: Pulmonary effort is normal. No respiratory distress.      Breath sounds: Normal breath sounds. No wheezing or rales.   Chest:      Chest wall: No tenderness.   Musculoskeletal:      Right lower leg: Edema present.      Left lower leg: Edema present.   Neurological:      Mental Status: She is alert and oriented to person, place, and time.   Psychiatric:         Behavior: Behavior normal.         Thought Content: Thought content normal.         Judgment: Judgment normal.                 Assessment/Plan:       1. Hypothyroidism due to acquired atrophy of thyroid      2. Pedal edema    - furosemide (LASIX) 20 MG Tab; Take 1 Tab by mouth every day.  Dispense: 30 Tab; Refill: 1    3. Obesity (BMI 35.0-39.9 without comorbidity)      4. Glucose intolerance    "

## 2021-02-24 ENCOUNTER — HOSPITAL ENCOUNTER (OUTPATIENT)
Dept: LAB | Facility: MEDICAL CENTER | Age: 41
End: 2021-02-24
Attending: SPECIALIST
Payer: COMMERCIAL

## 2021-02-24 LAB
ALBUMIN SERPL BCP-MCNC: 3.6 G/DL (ref 3.2–4.9)
ALBUMIN/GLOB SERPL: 1.2 G/DL
ALP SERPL-CCNC: 62 U/L (ref 30–99)
ALT SERPL-CCNC: 15 U/L (ref 2–50)
ANION GAP SERPL CALC-SCNC: 5 MMOL/L (ref 7–16)
AST SERPL-CCNC: 14 U/L (ref 12–45)
BASOPHILS # BLD AUTO: 0.5 % (ref 0–1.8)
BASOPHILS # BLD: 0.04 K/UL (ref 0–0.12)
BILIRUB SERPL-MCNC: 1 MG/DL (ref 0.1–1.5)
BUN SERPL-MCNC: 11 MG/DL (ref 8–22)
CALCIUM SERPL-MCNC: 8.7 MG/DL (ref 8.5–10.5)
CHLORIDE SERPL-SCNC: 99 MMOL/L (ref 96–112)
CHOLEST SERPL-MCNC: 155 MG/DL (ref 100–199)
CO2 SERPL-SCNC: 25 MMOL/L (ref 20–33)
CREAT SERPL-MCNC: 0.57 MG/DL (ref 0.5–1.4)
EOSINOPHIL # BLD AUTO: 0.11 K/UL (ref 0–0.51)
EOSINOPHIL NFR BLD: 1.3 % (ref 0–6.9)
ERYTHROCYTE [DISTWIDTH] IN BLOOD BY AUTOMATED COUNT: 43.8 FL (ref 35.9–50)
EST. AVERAGE GLUCOSE BLD GHB EST-MCNC: 111 MG/DL
FASTING STATUS PATIENT QL REPORTED: NORMAL
GLOBULIN SER CALC-MCNC: 3 G/DL (ref 1.9–3.5)
GLUCOSE SERPL-MCNC: 87 MG/DL (ref 65–99)
HBA1C MFR BLD: 5.5 % (ref 4–5.6)
HCT VFR BLD AUTO: 38.6 % (ref 37–47)
HDLC SERPL-MCNC: 47 MG/DL
HGB BLD-MCNC: 12 G/DL (ref 12–16)
IMM GRANULOCYTES # BLD AUTO: 0.02 K/UL (ref 0–0.11)
IMM GRANULOCYTES NFR BLD AUTO: 0.2 % (ref 0–0.9)
LDLC SERPL CALC-MCNC: 90 MG/DL
LYMPHOCYTES # BLD AUTO: 2.15 K/UL (ref 1–4.8)
LYMPHOCYTES NFR BLD: 25.1 % (ref 22–41)
MCH RBC QN AUTO: 26.7 PG (ref 27–33)
MCHC RBC AUTO-ENTMCNC: 31.1 G/DL (ref 33.6–35)
MCV RBC AUTO: 86 FL (ref 81.4–97.8)
MONOCYTES # BLD AUTO: 0.54 K/UL (ref 0–0.85)
MONOCYTES NFR BLD AUTO: 6.3 % (ref 0–13.4)
NEUTROPHILS # BLD AUTO: 5.7 K/UL (ref 2–7.15)
NEUTROPHILS NFR BLD: 66.6 % (ref 44–72)
NRBC # BLD AUTO: 0 K/UL
NRBC BLD-RTO: 0 /100 WBC
PLATELET # BLD AUTO: 267 K/UL (ref 164–446)
PMV BLD AUTO: 11.3 FL (ref 9–12.9)
POTASSIUM SERPL-SCNC: 3.6 MMOL/L (ref 3.6–5.5)
PROT SERPL-MCNC: 6.6 G/DL (ref 6–8.2)
RBC # BLD AUTO: 4.49 M/UL (ref 4.2–5.4)
SODIUM SERPL-SCNC: 129 MMOL/L (ref 135–145)
T4 FREE SERPL-MCNC: 0.9 NG/DL (ref 0.93–1.7)
TRIGL SERPL-MCNC: 91 MG/DL (ref 0–149)
TSH SERPL DL<=0.005 MIU/L-ACNC: 9.27 UIU/ML (ref 0.38–5.33)
WBC # BLD AUTO: 8.6 K/UL (ref 4.8–10.8)

## 2021-02-24 PROCEDURE — 84439 ASSAY OF FREE THYROXINE: CPT

## 2021-02-24 PROCEDURE — 83036 HEMOGLOBIN GLYCOSYLATED A1C: CPT

## 2021-02-24 PROCEDURE — 84443 ASSAY THYROID STIM HORMONE: CPT

## 2021-02-24 PROCEDURE — 85025 COMPLETE CBC W/AUTO DIFF WBC: CPT

## 2021-02-24 PROCEDURE — 80061 LIPID PANEL: CPT

## 2021-02-24 PROCEDURE — 80053 COMPREHEN METABOLIC PANEL: CPT

## 2021-02-24 PROCEDURE — 36415 COLL VENOUS BLD VENIPUNCTURE: CPT

## 2021-03-01 RX ORDER — LEVOTHYROXINE SODIUM 0.07 MG/1
75 TABLET ORAL
Qty: 90 TABLET | Refills: 1 | Status: SHIPPED | OUTPATIENT
Start: 2021-03-01 | End: 2021-08-27

## 2021-03-10 ENCOUNTER — TELEMEDICINE (OUTPATIENT)
Dept: MEDICAL GROUP | Facility: PHYSICIAN GROUP | Age: 41
End: 2021-03-10
Payer: COMMERCIAL

## 2021-03-10 DIAGNOSIS — E74.39 GLUCOSE INTOLERANCE: ICD-10-CM

## 2021-03-10 DIAGNOSIS — E03.4 HYPOTHYROIDISM DUE TO ACQUIRED ATROPHY OF THYROID: ICD-10-CM

## 2021-03-10 DIAGNOSIS — G56.22 ULNAR NEUROPATHY OF LEFT UPPER EXTREMITY: ICD-10-CM

## 2021-03-10 PROCEDURE — 99214 OFFICE O/P EST MOD 30 MIN: CPT | Mod: 95,CR | Performed by: FAMILY MEDICINE

## 2021-03-10 ASSESSMENT — PATIENT HEALTH QUESTIONNAIRE - PHQ9: CLINICAL INTERPRETATION OF PHQ2 SCORE: 0

## 2021-03-10 NOTE — PROGRESS NOTES
Virtual Visit: Established Patient   This visit was conducted via Zoom using secure and encrypted videoconferencing technology. The patient was in a private location in the state of [place].    The patient's identity was confirmed and verbal consent was obtained for this virtual visit.    Subjective:   CC:   Chief Complaint   Patient presents with   • Lab Results       Asmita Vick is a 40 y.o. female presenting for evaluation and management of:  1. Hypothyroidism due to acquired atrophy of thyroid  Low level on 50 mcg increased to 75  - Comp Metabolic Panel; Future  - FREE THYROXINE; Future  - HEMOGLOBIN A1C; Future  - TRIIDOTHYRONINE; Future  - TSH; Future  - VITAMIN D,25 HYDROXY; Future  - VITAMIN B12; Future  - FOLATE; Future    2. Glucose intolerance  improved  - Comp Metabolic Panel; Future  - FREE THYROXINE; Future  - HEMOGLOBIN A1C; Future  - TRIIDOTHYRONINE; Future  - TSH; Future  - VITAMIN D,25 HYDROXY; Future  - VITAMIN B12; Future  - FOLATE; Future    3. Ulnar neuropathy of left upper extremity  Numbness and tingling almost constantly in left ulnar area of ring finger and little finger  - REFERRAL TO NEURODIAGNOSTICS (EEG,EP,EMG/NCS/DBS)  - REFERRAL TO OUTPATIENT INTERVENTIONAL PAIN CLINIC  - Comp Metabolic Panel; Future  - FREE THYROXINE; Future  - HEMOGLOBIN A1C; Future  - TRIIDOTHYRONINE; Future  - TSH; Future  - VITAMIN D,25 HYDROXY; Future  - VITAMIN B12; Future  - FOLATE; Future    Past Medical History:   Diagnosis Date   • Abnormal skin color 2/6/2013   • ASTHMA     in childhood only, none recent   • Family history of diabetes mellitus 2/6/2013   • Thyroid disease      No past surgical history on file.  Social History     Tobacco Use   • Smoking status: Never Smoker   • Smokeless tobacco: Never Used   Substance Use Topics   • Alcohol use: No   • Drug use: No     Family History   Problem Relation Age of Onset   • Hypertension Mother    • Diabetes Mother    • Diabetes Father    •  Hypertension Father          Current Outpatient Medications:   •  levothyroxine (SYNTHROID) 75 MCG Tab, Take 1 tablet by mouth Every morning on an empty stomach., Disp: 90 tablet, Rfl: 1  •  furosemide (LASIX) 20 MG Tab, Take 1 Tab by mouth every day., Disp: 30 Tab, Rfl: 1  •  acetaminophen (TYLENOL) 325 MG TABS, Take 1 Tab by mouth every four hours as needed for Mild Pain ((Pain Scale 1-3))., Disp: 30 Tab, Rfl: 0  •  acetaminophen 650 MG TABS, Take 650 mg by mouth every four hours as needed for Fever (over 100.4 F)., Disp: 30 Tab, Rfl: 0  •  Prenatal MV-Min-Fe Fum-FA-DHA (PRENATAL 1 PO), Take  by mouth., Disp: , Rfl:   •  oxycodone-acetaminophen (PERCOCET) 5-325 MG TABS, Take 1-2 Tabs by mouth every four hours as needed for Severe Pain ((Pain Scale 7-10)). (Patient not taking: Reported on 11/21/2018), Disp: 15 Tab, Rfl: 0    Patient was instructed on the use of medications, either prescriptions or OTC and informed on when the appropriate follow up time period should be. In addition, patient was also instructed that should any acute worsening occur that they should notify this clinic asap or call 911.          ROS   Denies any recent fevers or chills. No nausea or vomiting. No chest pains or shortness of breath.     Allergies   Allergen Reactions   • Nkda [No Known Drug Allergy]        Current medicines (including changes today)  Current Outpatient Medications   Medication Sig Dispense Refill   • levothyroxine (SYNTHROID) 75 MCG Tab Take 1 tablet by mouth Every morning on an empty stomach. 90 tablet 1   • furosemide (LASIX) 20 MG Tab Take 1 Tab by mouth every day. 30 Tab 1   • acetaminophen (TYLENOL) 325 MG TABS Take 1 Tab by mouth every four hours as needed for Mild Pain ((Pain Scale 1-3)). 30 Tab 0   • acetaminophen 650 MG TABS Take 650 mg by mouth every four hours as needed for Fever (over 100.4 F). 30 Tab 0   • Prenatal MV-Min-Fe Fum-FA-DHA (PRENATAL 1 PO) Take  by mouth.     • oxycodone-acetaminophen (PERCOCET)  5-325 MG TABS Take 1-2 Tabs by mouth every four hours as needed for Severe Pain ((Pain Scale 7-10)). (Patient not taking: Reported on 11/21/2018) 15 Tab 0     No current facility-administered medications for this visit.       Patient Active Problem List    Diagnosis Date Noted   • Hypothyroidism due to acquired atrophy of thyroid 11/21/2018   • Family history of diabetes mellitus 02/06/2013       Family History   Problem Relation Age of Onset   • Hypertension Mother    • Diabetes Mother    • Diabetes Father    • Hypertension Father        She  has a past medical history of Abnormal skin color (2/6/2013), ASTHMA, Family history of diabetes mellitus (2/6/2013), and Thyroid disease.  She  has no past surgical history on file.       Objective:   There were no vitals taken for this visit.    Physical Exam:  Constitutional: Alert, no distress, well-groomed.  Skin: No rashes in visible areas.  Eye: Round. Conjunctiva clear, lids normal. No icterus.   ENMT: Lips pink without lesions, good dentition, moist mucous membranes. Phonation normal.  Neck: No masses, no thyromegaly. Moves freely without pain.  Respiratory: Unlabored respiratory effort, no cough or audible wheeze  Psych: Alert and oriented x3, normal affect and mood.       Assessment and Plan:   The following treatment plan was discussed:     1. Hypothyroidism due to acquired atrophy of thyroid  - Comp Metabolic Panel; Future  - FREE THYROXINE; Future  - HEMOGLOBIN A1C; Future  - TRIIDOTHYRONINE; Future  - TSH; Future  - VITAMIN D,25 HYDROXY; Future  - VITAMIN B12; Future  - FOLATE; Future    2. Glucose intolerance  - Comp Metabolic Panel; Future  - FREE THYROXINE; Future  - HEMOGLOBIN A1C; Future  - TRIIDOTHYRONINE; Future  - TSH; Future  - VITAMIN D,25 HYDROXY; Future  - VITAMIN B12; Future  - FOLATE; Future    3. Ulnar neuropathy of left upper extremity  - REFERRAL TO NEURODIAGNOSTICS (EEG,EP,EMG/NCS/DBS)  - REFERRAL TO OUTPATIENT INTERVENTIONAL PAIN CLINIC  -  Comp Metabolic Panel; Future  - FREE THYROXINE; Future  - HEMOGLOBIN A1C; Future  - TRIIDOTHYRONINE; Future  - TSH; Future  - VITAMIN D,25 HYDROXY; Future  - VITAMIN B12; Future  - FOLATE; Future        Follow-up: No follow-ups on file.

## 2021-04-08 ENCOUNTER — OFFICE VISIT (OUTPATIENT)
Dept: PHYSICAL MEDICINE AND REHAB | Facility: MEDICAL CENTER | Age: 41
End: 2021-04-08
Payer: COMMERCIAL

## 2021-04-08 VITALS
HEIGHT: 67 IN | BODY MASS INDEX: 36.23 KG/M2 | TEMPERATURE: 97.5 F | DIASTOLIC BLOOD PRESSURE: 76 MMHG | OXYGEN SATURATION: 94 % | SYSTOLIC BLOOD PRESSURE: 118 MMHG | HEART RATE: 85 BPM | WEIGHT: 230.82 LBS

## 2021-04-08 DIAGNOSIS — M79.2 NEURALGIA: ICD-10-CM

## 2021-04-08 DIAGNOSIS — M79.10 MYALGIA: ICD-10-CM

## 2021-04-08 DIAGNOSIS — G56.22 ULNAR NEUROPATHY AT ELBOW, LEFT: ICD-10-CM

## 2021-04-08 DIAGNOSIS — M79.10 TRIGGER POINT: ICD-10-CM

## 2021-04-08 PROCEDURE — 99244 OFF/OP CNSLTJ NEW/EST MOD 40: CPT | Performed by: PHYSICAL MEDICINE & REHABILITATION

## 2021-04-08 ASSESSMENT — PATIENT HEALTH QUESTIONNAIRE - PHQ9: CLINICAL INTERPRETATION OF PHQ2 SCORE: 0

## 2021-04-08 ASSESSMENT — FIBROSIS 4 INDEX: FIB4 SCORE: 0.56

## 2021-04-08 ASSESSMENT — PAIN SCALES - GENERAL: PAINLEVEL: 4=SLIGHT-MODERATE PAIN

## 2021-04-08 NOTE — PATIENT INSTRUCTIONS
I recommend getting a Heelbo.  You can find these on [employer].  Wear this at night when you are sleeping.  Also wear this when you are working at your workstation.    Do not rest her elbows on firm objects.  Try to have your workstation with the keyboard falling to an area where your arms will be at a 90 degree angle.  This is approximately bellybutton height.

## 2021-04-08 NOTE — Clinical Note
Dear Mahamed Abebe M.D. ,     [facility]  I believe the majority the patient's symptoms are from an ulnar neuropathy and she has trigger points.  These are likely from the ergonomics of her workstation I gave her tips on how to improve this.  I suspect that her symptoms will improve with the improved ergonomics and with conservative treatments which were initiated today.  I advised her to follow-up with me if her symptoms do not improve with conservative treatment.    Please see my note for more details       Should you have any questions or concerns please do not hesitate to contact me.    Moustapha Ortiz M.D.

## 2021-04-08 NOTE — PROGRESS NOTES
New patient note    Physiatry (physical medicine and  Rehabilitation), interventional spine and sports medicine    Date of service: See epic    Chief complaint:   Chief Complaint   Patient presents with   • New Patient     Neck pain        Referring provider: Mahamed Abebe M.D.     HISTORY    HPI: Asmita Guzman-Chago 40 y.o.  who presents today with Diagnoses of Neuralgia, Ulnar neuropathy at elbow, left, Trigger point, and Myalgia were pertinent to this visit.    HPI    Patient is having separate issues of left-sided elbow pain radiating to the little finger and ring finger on the left hand worsening over the past 8 months with numbness tingling burning sensation in the hand.    She also has a separate issue of moderate pain around the left trapezius muscle and periscapular region on the left shoulder which has been gradually worsening over the past 8 months as well.    The patient transition to work from home at this time.  He describes a workstation where her keyboard is quite high and she has to reach significantly above the level of the umbilicus to reach the keyboard.       Medical records review:  I reviewed the note from the referring provider Mahamed Abebe M.D. including the note dated 3/10/2021 where the patient was seen for glucose intolerance with labs ordered hypothyroidism.  Concern for ulnar neuropathy of the left upper extremity referred to pain clinic for further evaluation also referred for an EMG with neurology.           ROS:   Red Flags ROS:   Fever, Chills, Sweats: Denies  Involuntary Weight Loss: Denies  Bladder Incontinence: Denies  Bowel Incontinence: denies  Saddle Anesthesia: Denies    All other systems reviewed and negative.       PMHx:   Past Medical History:   Diagnosis Date   • Abnormal skin color 2/6/2013   • ASTHMA     in childhood only, none recent   • Family history of diabetes mellitus 2/6/2013   • Thyroid disease          Current Outpatient Medications on File Prior to  Visit   Medication Sig Dispense Refill   • levothyroxine (SYNTHROID) 75 MCG Tab Take 1 tablet by mouth Every morning on an empty stomach. 90 tablet 1   • acetaminophen (TYLENOL) 325 MG TABS Take 1 Tab by mouth every four hours as needed for Mild Pain ((Pain Scale 1-3)). 30 Tab 0   • acetaminophen 650 MG TABS Take 650 mg by mouth every four hours as needed for Fever (over 100.4 F). 30 Tab 0   • furosemide (LASIX) 20 MG Tab Take 1 Tab by mouth every day. (Patient not taking: Reported on 4/8/2021) 30 Tab 1   • Prenatal MV-Min-Fe Fum-FA-DHA (PRENATAL 1 PO) Take  by mouth.       No current facility-administered medications on file prior to visit.        PSHx:   History reviewed. No pertinent surgical history.    Family history   Family History   Problem Relation Age of Onset   • Hypertension Mother    • Diabetes Mother    • Diabetes Father    • Hypertension Father          Medications: reviewed on epic.   Outpatient Medications Marked as Taking for the 4/8/21 encounter (Office Visit) with Moustapha Ortiz M.D.   Medication Sig Dispense Refill   • diclofenac sodium 1 % Gel Place 3 g on the skin 4 times a day as needed (pain). 100 g 3   • levothyroxine (SYNTHROID) 75 MCG Tab Take 1 tablet by mouth Every morning on an empty stomach. 90 tablet 1   • acetaminophen (TYLENOL) 325 MG TABS Take 1 Tab by mouth every four hours as needed for Mild Pain ((Pain Scale 1-3)). 30 Tab 0   • acetaminophen 650 MG TABS Take 650 mg by mouth every four hours as needed for Fever (over 100.4 F). 30 Tab 0        Allergies:   Allergies   Allergen Reactions   • Nkda [No Known Drug Allergy]        Social Hx:   Social History     Socioeconomic History   • Marital status:      Spouse name: Not on file   • Number of children: 1   • Years of education: [employer]   • Highest education level: Not on file   Occupational History   • Not on file   Tobacco Use   • Smoking status: Never Smoker   • Smokeless tobacco: Never Used   Substance and Sexual  "Activity   • Alcohol use: No   • Drug use: No   • Sexual activity: Yes     Partners: Male     Birth control/protection: Condom   Other Topics Concern   •  Service No   • Blood Transfusions No   • Caffeine Concern No   • Occupational Exposure No   • Hobby Hazards No   • Sleep Concern No   • Stress Concern Yes   • Weight Concern No   • Special Diet Yes   • Back Care No   • Exercise Yes   • Bike Helmet No   • Seat Belt Yes   • Self-Exams Yes   Social History Narrative    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 Lived in [facility] before moved to [place].             Social Determinants of Health     Financial Resource Strain:    • Difficulty of Paying Living Expenses:    Food Insecurity:    • Worried About Running Out of Food in the Last Year:    • Ran Out of Food in the Last Year:    Transportation Needs:    • Lack of Transportation (Medical):    • Lack of Transportation (Non-Medical):    Physical Activity:    • Days of Exercise per Week:    • Minutes of Exercise per Session:    Stress:    • Feeling of Stress :    Social Connections:    • Frequency of Communication with Friends and Family:    • Frequency of Social Gatherings with Friends and Family:    • Attends [employer] Services:    • Active Member of Clubs or Organizations:    • Attends Club or Organization Meetings:    • Marital Status:    Intimate Partner Violence:    • Fear of Current or Ex-Partner:    • Emotionally Abused:    • Physically Abused:    • Sexually Abused:          EXAMINATION     Physical Exam:   Vitals: /76 (BP Location: Right arm, Patient Position: Sitting, BP Cuff Size: Adult)   Pulse 85   Temp 36.4 °C (97.5 °F) (Temporal)   Ht 1.702 m (5' 7\")   Wt 105 kg (230 lb 13.2 oz)   SpO2 94%     Constitutional:   Body Habitus: Body mass index is 36.15 kg/m².  Cooperation: Fully cooperates with exam  Appearance: Well-groomed, well-nourished, not disheveled     Eyes: No scleral icterus to suggest severe liver disease, no " proptosis to suggest severe hyperthyroid    ENT -no obvious auditory deficits, no obvious tongue lesions, tongue midline, no facial droop     Skin -no rashes or lesions noted     Respiratory-  breathing comfortable on room air, no audible wheezing    Cardiovascular- capillary refills less than 2 seconds. No lower extremity edema is noted.     Gastrointestinal - no obvious abdominal masses, No tenderness to palpation in the abdomen    Psychiatric- alert and oriented ×3. Normal affect.     Gait - normal gait, no use of ambulatory device, nonantalgic.     Musculoskeletal and Neuro -     right and left Shoulder   Inspection: No rashes are noted on the bilateral shoulders. Humerus is not grossly high riding when comparing the right and left sides.  Palpation: No tenderness to palpation over the biceps tendon, acromioclavicular joint, scapular spine, supraspinous, infraspinatus, greater tuberosity, lesser tuberosity, trapezius.  Range of motion: Active range of motion forward ark and lateral arc within normal limits. The patient is able to lower his arm slowly with no drop arm.  Special tests:  Neers: negative  Hernandez: negative  Speeds: Negative  Jürgensen signs: Negative  Empty can: negative  O'Briens test: Negative  Crossarm test: Negative  Resisted internal rotation: Negative  Resisted external rotation: neagtive  Resisted elbow extension: Negative    right and left elbow exam  Inspection: No rashes, swelling or deformities. No swelling around the olecranon.  Palpation:  No tenderness palpation to the lateral epicondyle, medial epicondyle, olecranon, cubital fossa, common flexor tendon.  Range of motion: Normal in the elbow in flexion and extension.  Special tests:  Resisted wrist extension (Cozens test): negative  Resisted wrist flexion: Negative    Bilateral hands:   Inspection: No swelling,  Deformities or rashes. Symmetric appearing thenar and hyperthenar regions bilaterally.  Palpation no significant tenderness  to palpation throughout the bilateral hands  Range of motion is within normal limits throughout bilateral hands, fingers and wrist.  Special tests:  Tinel's at the wrist over the median nerve negative bilaterally  Carpal tunnel compression: negative bilaterally  Phalen's test: negative bilaterally  Finkelstein's test: negative bilaterally  CMC grind test negative bilaterally      Cervical spine   Inspection: No deformities of the skin over the cervical spine. No rashes or lesions.    full   active range of motion in all directions, without  pain      Spurling’s sign: negative bilaterally    No signs of muscular atrophy in bilateral upper extremities     No tenderness to palpation of the cervical spine    Positive for trigger points which reproduce the patient's pain in the left trapezius muscle, rhomboid and left lower cervical paraspinous muscle    Key points for the international standards for neurological classification of spinal cord injury (ISNCSCI) to light touch.     Dermatome R L   C4 2 2   C5 2 2   C6 2 2   C7 2 2   C8 2 1   T1 2 2   T2 2 2                                   The abnormal sensation and pain is in the left little finger and the ulnar aspect of the left ring finger    Motor Exam Upper Extremities   ? Myotome R L   Shoulder flexion C5 5 5   Elbow flexion C5 5 5   Wrist extension C6 5 5   Elbow extension C7 5 5   Finger flexion C8 5 5   Finger abduction T1 5 5-     Reflexes  ?  R L   Biceps  2+ 2+   Brachioradialis  2+ 2+     Benitez’s sign negative bilaterally              MEDICAL DECISION MAKING    Medical records review: see under HPI section.     DATA    Labs:   Lab Results   Component Value Date/Time    SODIUM 129 (L) 02/24/2021 06:59 AM    POTASSIUM 3.6 02/24/2021 06:59 AM    CHLORIDE 99 02/24/2021 06:59 AM    CO2 25 02/24/2021 06:59 AM    ANION 5.0 (L) 02/24/2021 06:59 AM    GLUCOSE 87 02/24/2021 06:59 AM    BUN 11 02/24/2021 06:59 AM    CREATININE 0.57 02/24/2021 06:59 AM    CALCIUM 8.7  02/24/2021 06:59 AM    ASTSGOT 14 02/24/2021 06:59 AM    ALTSGPT 15 02/24/2021 06:59 AM    TBILIRUBIN 1.0 02/24/2021 06:59 AM    ALBUMIN 3.6 02/24/2021 06:59 AM    TOTPROTEIN 6.6 02/24/2021 06:59 AM    GLOBULIN 3.0 02/24/2021 06:59 AM    AGRATIO 1.2 02/24/2021 06:59 AM   ]    No results found for: PROTHROMBTM, INR     Lab Results   Component Value Date/Time    WBC 8.6 02/24/2021 06:59 AM    RBC 4.49 02/24/2021 06:59 AM    HEMOGLOBIN 12.0 02/24/2021 06:59 AM    HEMATOCRIT 38.6 02/24/2021 06:59 AM    MCV 86.0 02/24/2021 06:59 AM    MCH 26.7 (L) 02/24/2021 06:59 AM    MCHC 31.1 (L) 02/24/2021 06:59 AM    MPV 11.3 02/24/2021 06:59 AM    NEUTSPOLYS 66.60 02/24/2021 06:59 AM    LYMPHOCYTES 25.10 02/24/2021 06:59 AM    MONOCYTES 6.30 02/24/2021 06:59 AM    EOSINOPHILS 1.30 02/24/2021 06:59 AM    BASOPHILS 0.50 02/24/2021 06:59 AM    HYPOCHROMIA 1+ 03/01/2014 07:43 AM        Lab Results   Component Value Date/Time    HBA1C 5.5 02/24/2021 06:59 AM        Imaging:   I personally reviewed following images, these are my reads      IMAGING radiology reads. I reviewed the following radiology reads             Results for orders placed during the hospital encounter of 11/17/16   MR-BRAIN-W/O    Impression MRI of the brain without contrast within normal limits.                                                                                                                                                                Diagnosis   Visit Diagnoses     ICD-10-CM   1. Neuralgia  M79.2   2. Ulnar neuropathy at elbow, left  G56.22   3. Trigger point  M79.10   4. Myalgia  M79.10           ASSESSMENT AND PLAN:  Asmita GuzmanSusana 40 y.o. female      Asmita was seen today for new patient.    Diagnoses and all orders for this visit:    Neuralgia  -     diclofenac sodium 1 % Gel; Place 3 g on the skin 4 times a day as needed (pain).  -     REFERRAL TO PHYSICAL THERAPY    Ulnar neuropathy at elbow, left  -     diclofenac sodium 1 % Gel;  Place 3 g on the skin 4 times a day as needed (pain).  -     REFERRAL TO PHYSICAL THERAPY    Trigger point  -     diclofenac sodium 1 % Gel; Place 3 g on the skin 4 times a day as needed (pain).  -     REFERRAL TO PHYSICAL THERAPY    Myalgia  -     diclofenac sodium 1 % Gel; Place 3 g on the skin 4 times a day as needed (pain).  -     REFERRAL TO PHYSICAL THERAPY        The patient symptoms are mostly secondary to an ulnar neuropathy at the left elbow as well as trigger points.  No signs of rotator cuff pathology.  A cervical radiculopathy could not be excluded however this is less likely given the focal nature of the paresthesias and pain    Physical therapy: I ordered physical therapy to focus on strengthening and stretching.     home exercise program: We discussed lifestyle modifications including proper ergonomics for her work setting at home.  I advised the patient to use a Heelbo when at this workstation and when sleeping    Diagnostic workup:   4/8/2021 for the patient's neuralgia, I performed limited diagnostic ultrasound of the left elbow.  The ulnar nerve sits in the ulnar groove with elbow extension and upon elbow flexion the ulnar nerve climbs towards the peak of the medial epicondyle.  This can be seen with ulnar neuropathy at the elbow    Medications:   As above     Interventional program: We discussed a left ulnar nerve block but the patient declined for now.  We also discussed trigger point injections but we decided to proceed conservatively as above      Follow-up: As needed with me.          Please note that this dictation was created using voice recognition software. I have made every reasonable attempt to correct obvious errors but there may be errors of grammar and content that I may have overlooked prior to finalization of this note.      Moustapha Ortiz MD  Physical Medicine and Rehabilitation  Interventional Spine and Sports Physiatry  [facility] Mahamed Abebe M.D.

## 2021-08-27 DIAGNOSIS — E03.4 HYPOTHYROIDISM DUE TO ACQUIRED ATROPHY OF THYROID: ICD-10-CM

## 2021-08-29 RX ORDER — LEVOTHYROXINE SODIUM 0.07 MG/1
TABLET ORAL
Qty: 90 TABLET | Refills: 1 | Status: SHIPPED | OUTPATIENT
Start: 2021-08-29 | End: 2022-03-10

## 2021-10-15 ENCOUNTER — HOSPITAL ENCOUNTER (OUTPATIENT)
Dept: RADIOLOGY | Facility: MEDICAL CENTER | Age: 41
End: 2021-10-15
Attending: FAMILY MEDICINE
Payer: COMMERCIAL

## 2021-10-15 DIAGNOSIS — Z12.31 VISIT FOR SCREENING MAMMOGRAM: ICD-10-CM

## 2021-10-15 PROCEDURE — 77063 BREAST TOMOSYNTHESIS BI: CPT

## 2021-10-22 LAB
25(OH)D3+25(OH)D2 SERPL-MCNC: 29.4 NG/ML (ref 30–100)
ALBUMIN SERPL-MCNC: 4.1 G/DL (ref 3.8–4.8)
ALBUMIN/GLOB SERPL: 1.6 {RATIO} (ref 1.2–2.2)
ALP SERPL-CCNC: 73 IU/L (ref 44–121)
ALT SERPL-CCNC: 13 IU/L (ref 0–32)
AST SERPL-CCNC: 12 IU/L (ref 0–40)
BILIRUB SERPL-MCNC: 1 MG/DL (ref 0–1.2)
BUN SERPL-MCNC: 7 MG/DL (ref 6–24)
BUN/CREAT SERPL: 10 (ref 9–23)
CALCIUM SERPL-MCNC: 9.1 MG/DL (ref 8.7–10.2)
CHLORIDE SERPL-SCNC: 102 MMOL/L (ref 96–106)
CO2 SERPL-SCNC: 24 MMOL/L (ref 20–29)
CREAT SERPL-MCNC: 0.69 MG/DL (ref 0.57–1)
FOLATE SERPL-MCNC: 12.6 NG/ML
GLOBULIN SER CALC-MCNC: 2.5 G/DL (ref 1.5–4.5)
GLUCOSE SERPL-MCNC: 87 MG/DL (ref 65–99)
HBA1C MFR BLD: 5.8 % (ref 4.8–5.6)
POTASSIUM SERPL-SCNC: 4.4 MMOL/L (ref 3.5–5.2)
PROT SERPL-MCNC: 6.6 G/DL (ref 6–8.5)
SODIUM SERPL-SCNC: 137 MMOL/L (ref 134–144)
T3 SERPL-MCNC: 106 NG/DL (ref 71–180)
T4 FREE SERPL-MCNC: 1.32 NG/DL (ref 0.82–1.77)
TSH SERPL DL<=0.005 MIU/L-ACNC: 2.39 UIU/ML (ref 0.45–4.5)
VIT B12 SERPL-MCNC: 618 PG/ML (ref 232–1245)

## 2021-10-28 ENCOUNTER — TELEPHONE (OUTPATIENT)
Dept: MEDICAL GROUP | Facility: PHYSICIAN GROUP | Age: 41
End: 2021-10-28

## 2021-10-28 NOTE — TELEPHONE ENCOUNTER
Phone Number Called: [phone] (home) [phone] (work)      Call outcome: Left detailed message for patient. Informed to call back with any additional questions.    Message: Advised of normal thyroid labs, low vitamin d and to replace with 2000 units/day otc

## 2021-10-28 NOTE — TELEPHONE ENCOUNTER
----- Message from Mahamed Abebe M.D. sent at 10/25/2021  8:15 AM PDT -----  Normal thyroid, Labs show patient is low on vitamin d, they needs to replace this with 2000 units per day otc

## 2022-03-10 DIAGNOSIS — R53.82 CHRONIC FATIGUE AND MALAISE: ICD-10-CM

## 2022-03-10 DIAGNOSIS — R53.81 CHRONIC FATIGUE AND MALAISE: ICD-10-CM

## 2022-03-10 DIAGNOSIS — E03.4 HYPOTHYROIDISM DUE TO ACQUIRED ATROPHY OF THYROID: ICD-10-CM

## 2022-03-10 RX ORDER — LEVOTHYROXINE SODIUM 0.07 MG/1
TABLET ORAL
Qty: 90 TABLET | Refills: 1 | Status: SHIPPED | OUTPATIENT
Start: 2022-03-10 | End: 2022-08-15

## 2022-07-18 ENCOUNTER — TELEMEDICINE (OUTPATIENT)
Dept: MEDICAL GROUP | Facility: PHYSICIAN GROUP | Age: 42
End: 2022-07-18
Payer: COMMERCIAL

## 2022-07-18 VITALS — BODY MASS INDEX: 35.36 KG/M2 | WEIGHT: 220 LBS | HEIGHT: 66 IN

## 2022-07-18 DIAGNOSIS — E74.39 GLUCOSE INTOLERANCE: ICD-10-CM

## 2022-07-18 PROCEDURE — 99213 OFFICE O/P EST LOW 20 MIN: CPT | Mod: 95 | Performed by: FAMILY MEDICINE

## 2022-07-18 ASSESSMENT — FIBROSIS 4 INDEX: FIB4 SCORE: 0.52

## 2022-07-18 ASSESSMENT — PATIENT HEALTH QUESTIONNAIRE - PHQ9: CLINICAL INTERPRETATION OF PHQ2 SCORE: 0

## 2022-07-18 NOTE — PROGRESS NOTES
Virtual Visit: Established Patient   This visit was conducted via Zoom using secure and encrypted videoconferencing technology.   The patient was in their home in the state [facility].    The patient's identity was confirmed and verbal consent was obtained for this virtual visit.     Subjective:   CC:   Chief Complaint   Patient presents with   • Lab Results       Asmita Vick is a 41 y.o. female presenting for evaluation and management of:    1. Glucose intolerance  Due to the patient's issues with glucose management, today they were extensively counseled on a low carb diet and exercise and losing weight      Past Medical History:   Diagnosis Date   • Abnormal skin color 2/6/2013   • ASTHMA     in childhood only, none recent   • Family history of diabetes mellitus 2/6/2013   • Thyroid disease      No past surgical history on file.  Social History     Tobacco Use   • Smoking status: Never Smoker   • Smokeless tobacco: Never Used   Vaping Use   • Vaping Use: Never used   Substance Use Topics   • Alcohol use: No   • Drug use: No     Family History   Problem Relation Age of Onset   • Hypertension Mother    • Diabetes Mother    • Diabetes Father    • Hypertension Father          Current Outpatient Medications:   •  levothyroxine (SYNTHROID) 75 MCG Tab, TAKE ONE TABLET BY MOUTH EVERY MORNING ON AN EMPTY STOMACH, Disp: 90 Tablet, Rfl: 1  •  acetaminophen (TYLENOL) 325 MG TABS, Take 1 Tab by mouth every four hours as needed for Mild Pain ((Pain Scale 1-3))., Disp: 30 Tab, Rfl: 0    Patient was instructed on the use of medications, either prescriptions or OTC and informed on when the appropriate follow up time period should be. In addition, patient was also instructed that should any acute worsening occur that they should notify this clinic asap or call 911.        ROS       Current medicines (including changes today)  Current Outpatient Medications   Medication Sig Dispense Refill   • levothyroxine (SYNTHROID) 75  "MCG Tab TAKE ONE TABLET BY MOUTH EVERY MORNING ON AN EMPTY STOMACH 90 Tablet 1   • acetaminophen (TYLENOL) 325 MG TABS Take 1 Tab by mouth every four hours as needed for Mild Pain ((Pain Scale 1-3)). 30 Tab 0     No current facility-administered medications for this visit.       Patient Active Problem List    Diagnosis Date Noted   • Hypothyroidism due to acquired atrophy of thyroid 11/21/2018   • Family history of diabetes mellitus 02/06/2013        Objective:   Ht 1.676 m (5' 6\")   Wt 99.8 kg (220 lb)   LMP 06/30/2022   Breastfeeding No   BMI 35.51 kg/m²     Physical Exam  Constitutional: Alert, no distress, well-groomed.  Skin: No rashes in visible areas.  Eye: Round. Conjunctiva clear, lids normal. No icterus.   ENMT: Lips pink without lesions, good dentition, moist mucous membranes. Phonation normal.  Neck: No masses, no thyromegaly. Moves freely without pain.  Respiratory: Unlabored respiratory effort, no cough or audible wheeze  Psych: Alert and oriented x3, normal affect and mood.     Assessment and Plan:   The following treatment plan was discussed:     1. Glucose intolerance      Follow-up: No follow-ups on file.         "

## 2022-08-12 DIAGNOSIS — E03.4 HYPOTHYROIDISM DUE TO ACQUIRED ATROPHY OF THYROID: ICD-10-CM

## 2022-08-15 RX ORDER — LEVOTHYROXINE SODIUM 0.07 MG/1
TABLET ORAL
Qty: 30 TABLET | Refills: 2 | Status: SHIPPED | OUTPATIENT
Start: 2022-08-15 | End: 2022-12-27

## 2022-12-23 DIAGNOSIS — E03.4 HYPOTHYROIDISM DUE TO ACQUIRED ATROPHY OF THYROID: ICD-10-CM

## 2022-12-23 NOTE — TELEPHONE ENCOUNTER
Received request via: Pharmacy    Was the patient seen in the last year in this department? Yes    Does the patient have an active prescription (recently filled or refills available) for medication(s) requested? No    Does the patient have long-term Plus and need 100 day supply (blood pressure, diabetes and cholesterol meds only)? Patient does not have SCP

## 2022-12-27 RX ORDER — LEVOTHYROXINE SODIUM 0.07 MG/1
TABLET ORAL
Qty: 30 TABLET | Refills: 2 | Status: SHIPPED | OUTPATIENT
Start: 2022-12-27 | End: 2023-04-04

## 2023-04-04 DIAGNOSIS — E03.4 HYPOTHYROIDISM DUE TO ACQUIRED ATROPHY OF THYROID: ICD-10-CM

## 2023-04-04 RX ORDER — LEVOTHYROXINE SODIUM 0.07 MG/1
TABLET ORAL
Qty: 30 TABLET | Refills: 2 | Status: SHIPPED | OUTPATIENT
Start: 2023-04-04 | End: 2023-07-31

## 2023-07-30 DIAGNOSIS — E03.4 HYPOTHYROIDISM DUE TO ACQUIRED ATROPHY OF THYROID: ICD-10-CM

## 2023-07-31 RX ORDER — LEVOTHYROXINE SODIUM 0.07 MG/1
TABLET ORAL
Qty: 30 TABLET | Refills: 2 | Status: SHIPPED | OUTPATIENT
Start: 2023-07-31 | End: 2023-10-31

## 2023-07-31 NOTE — TELEPHONE ENCOUNTER
Received request via: Pharmacy    Was the patient seen in the last year in this department? No    Does the patient have an active prescription (recently filled or refills available) for medication(s) requested? No    Does the patient have care home Plus and need 100 day supply (blood pressure, diabetes and cholesterol meds only)? Patient does not have SCP

## 2023-10-31 DIAGNOSIS — E03.4 HYPOTHYROIDISM DUE TO ACQUIRED ATROPHY OF THYROID: ICD-10-CM

## 2023-10-31 RX ORDER — LEVOTHYROXINE SODIUM 0.07 MG/1
TABLET ORAL
Qty: 30 TABLET | Refills: 2 | Status: SHIPPED | OUTPATIENT
Start: 2023-10-31 | End: 2024-02-12

## 2023-10-31 NOTE — TELEPHONE ENCOUNTER
Received request via: Pharmacy    Was the patient seen in the last year in this department? No    Does the patient have an active prescription (recently filled or refills available) for medication(s) requested? No    Does the patient have intermediate Plus and need 100 day supply (blood pressure, diabetes and cholesterol meds only)? Patient does not have SCP

## 2024-02-09 DIAGNOSIS — E03.4 HYPOTHYROIDISM DUE TO ACQUIRED ATROPHY OF THYROID: ICD-10-CM

## 2024-02-09 NOTE — TELEPHONE ENCOUNTER
Received request via: Pharmacy    Was the patient seen in the last year in this department? Yes    Does the patient have an active prescription (recently filled or refills available) for medication(s) requested? No      Does the patient have retirement Plus and need 100 day supply (blood pressure, diabetes and cholesterol meds only)? Patient does not have SCP

## 2024-02-12 RX ORDER — LEVOTHYROXINE SODIUM 0.07 MG/1
TABLET ORAL
Qty: 30 TABLET | Refills: 2 | Status: SHIPPED | OUTPATIENT
Start: 2024-02-12

## 2024-04-05 ENCOUNTER — OFFICE VISIT (OUTPATIENT)
Dept: MEDICAL GROUP | Facility: LAB | Age: 44
End: 2024-04-05
Payer: COMMERCIAL

## 2024-04-05 VITALS
OXYGEN SATURATION: 98 % | HEIGHT: 66 IN | SYSTOLIC BLOOD PRESSURE: 116 MMHG | BODY MASS INDEX: 37.28 KG/M2 | TEMPERATURE: 98.2 F | RESPIRATION RATE: 18 BRPM | WEIGHT: 232 LBS | HEART RATE: 80 BPM | DIASTOLIC BLOOD PRESSURE: 80 MMHG

## 2024-04-05 DIAGNOSIS — E55.9 VITAMIN D DEFICIENCY: ICD-10-CM

## 2024-04-05 DIAGNOSIS — G47.39 SLEEP APNEA-LIKE BEHAVIOR: ICD-10-CM

## 2024-04-05 DIAGNOSIS — Z76.89 ESTABLISHING CARE WITH NEW DOCTOR, ENCOUNTER FOR: ICD-10-CM

## 2024-04-05 DIAGNOSIS — E74.39 GLUCOSE INTOLERANCE: ICD-10-CM

## 2024-04-05 DIAGNOSIS — R20.0 HAND NUMBNESS: ICD-10-CM

## 2024-04-05 DIAGNOSIS — Z13.220 SCREENING FOR HYPERLIPIDEMIA: ICD-10-CM

## 2024-04-05 DIAGNOSIS — Z23 NEED FOR VACCINATION: ICD-10-CM

## 2024-04-05 DIAGNOSIS — R60.9 EDEMA, UNSPECIFIED TYPE: ICD-10-CM

## 2024-04-05 DIAGNOSIS — Z13.0 SCREENING FOR DEFICIENCY ANEMIA: ICD-10-CM

## 2024-04-05 DIAGNOSIS — Z12.31 ENCOUNTER FOR SCREENING MAMMOGRAM FOR MALIGNANT NEOPLASM OF BREAST: ICD-10-CM

## 2024-04-05 DIAGNOSIS — E03.4 HYPOTHYROIDISM DUE TO ACQUIRED ATROPHY OF THYROID: ICD-10-CM

## 2024-04-05 DIAGNOSIS — E66.09 CLASS 2 OBESITY DUE TO EXCESS CALORIES WITH BODY MASS INDEX (BMI) OF 37.0 TO 37.9 IN ADULT, UNSPECIFIED WHETHER SERIOUS COMORBIDITY PRESENT: ICD-10-CM

## 2024-04-05 PROBLEM — E66.9 OBESITY: Status: ACTIVE | Noted: 2024-04-05

## 2024-04-05 PROCEDURE — 3079F DIAST BP 80-89 MM HG: CPT | Performed by: FAMILY MEDICINE

## 2024-04-05 PROCEDURE — 90715 TDAP VACCINE 7 YRS/> IM: CPT | Performed by: FAMILY MEDICINE

## 2024-04-05 PROCEDURE — 99214 OFFICE O/P EST MOD 30 MIN: CPT | Mod: 25 | Performed by: FAMILY MEDICINE

## 2024-04-05 PROCEDURE — 3074F SYST BP LT 130 MM HG: CPT | Performed by: FAMILY MEDICINE

## 2024-04-05 PROCEDURE — 90471 IMMUNIZATION ADMIN: CPT | Performed by: FAMILY MEDICINE

## 2024-04-05 ASSESSMENT — PATIENT HEALTH QUESTIONNAIRE - PHQ9: CLINICAL INTERPRETATION OF PHQ2 SCORE: 0

## 2024-04-05 NOTE — PROGRESS NOTES
CC: Here to establish care, patient has multiple concerns as per HPI    HPI: Established, new to me  Asmita presents today to establish care, discussed the following today,      1. Establishing care with new doctor, encounter for  43-year-old female with past medical history significant for obesity, history of impaired glucose tolerance, history of hypothyroidism.  As part of her establishing care visit reviewed records and past medical problems and past medical history, family/social history.    Patient works as a  in the [employer], lives with her 2 daughters and her .    . Hypothyroidism due to acquired atrophy of thyroid  Chronic, patient was diagnosed around 5 years ago she was on 75 mcg of Synthroid, patient stopped taking the medication 1 month ago by choice, she said she wants to check her labs and see how is her thyroid function.  Denies concerns at this time.    . Glucose intolerance  History of elevated A1c, patient is due for labs    . Vitamin D deficiency  History of low vitamin D level, taking supplements regularly      Class 2 obesity   Chronic, discussed the rationale behind losing weight, including improvement of cardiovascular risk and healthy body in general including decreasing the knee pain, osteoarthritis, high cholesterol and better control of blood pressure and blood sugar.  Patient is interested in weight management, will do labs and then she will schedule appointment at her convenience.      Left hand pain:    Patient reports this is a chronic pain, she uses computer a lot during her work.  She has pain and numbness in the left hand, radiating to the left forearm and arm area and upper back.  This has been going on for a long time.  Associated with numbness and tingling sensation especially at night      Sleep apnea-like behavior:    Reports snoring as per her partner, also she notices that she stops breathing.  Patient BMI is elevated obesity class II BMI of  36, discussed with the patient symptoms of sleep apnea and the need to rule it out, will place referral to see sleep specialist.  Patient Active Problem List    Diagnosis Date Noted    Obesity 04/05/2024    Hypothyroidism due to acquired atrophy of thyroid 11/21/2018    Family history of diabetes mellitus 02/06/2013       Current Outpatient Medications   Medication Sig Dispense Refill    acetaminophen (TYLENOL) 325 MG TABS Take 1 Tab by mouth every four hours as needed for Mild Pain ((Pain Scale 1-3)). 30 Tab 0    levothyroxine (SYNTHROID) 75 MCG Tab TAKE 1 TABLET BY MOUTH EVERY DAY IN THE MORNING ON AN EMPTY STOMACH (Patient not taking: Reported on 4/5/2024) 30 Tablet 2     No current facility-administered medications for this visit.         Allergies as of 04/05/2024 - Reviewed 04/05/2024   Allergen Reaction Noted    Nkda [no known drug allergy]  03/23/2008        ROS: Denies any chest pain, Shortness of breath, Changes bowel or bladder, Lower extremity edema.    Physical Exam:  Gen.: Well-developed, well-nourished, no apparent distress,pleasant and cooperative with the examination  Skin:  Warm and dry with good turgor. No rashes or suspicious lesions in visible areas  Eye: PERRLA, conjunctiva and sclera clear, lids normal  HEENT: Normocephalic/atraumatic, sinuses nontender with palpation, TMs clear, nares patent with pink mucosa and clear rhinorrhea, lips without lesions, oropharynx clear.  Neck: Trachea midline,no masses or adenopathy  Thyroid: normal consistency and size. No masses or nodules. Not tender with palpation.  Cor: Regular rate and rhythm without murmur, gallop or rub.  Lungs: Respirations unlabored.Clear to auscultation with equal breath sounds bilaterally. No wheezes, rhonchi.  Abdomen: Soft nontender without hepatosplenomegaly or masses appreciated, normoactive bowel sounds. No hernias.  Extremities: No cyanosis, clubbing or edema, Symmetrical without deformities or malformations. Pulses 2+ and  symmetrical both upper and lower extremities  Lymphatic: No abnormal adenopathy of the neck groin or axillae.  Psych: Alert and oriented x 3.Normal affect, judgement,insight and memory.        Assessment and Plan.   43 y.o. female here to establish care  1. Establishing care with new doctor, encounter for  Reviewed medical history and anticipatory guidance, ordered labs for risk assessment    2. Screening for deficiency anemia    - CBC WITH DIFFERENTIAL; Future    3. Screening for hyperlipidemia    - Lipid Profile; Future    4. Encounter for screening mammogram for malignant neoplasm of breast    - MA-SCREENING MAMMO BILAT W/TOMOSYNTHESIS W/CAD; Future    5. Hypothyroidism due to acquired atrophy of thyroid  Chronic, patient stopped taking her medication, counseled against doing that.  Advised to recheck her thyroid function test and then I will refill the medication for her to start taking them.  She states she has already medication at home  - TSH WITH REFLEX TO FT4; Future  - Comp Metabolic Panel; Future    6. Glucose intolerance  Counseled for healthy lifestyle and weight loss  - HEMOGLOBIN A1C; Future    7. Vitamin D deficiency    - VITAMIN D,25 HYDROXY (DEFICIENCY); Future    8. Need for vaccination    - Tdap =>8yo IM    9. Class 2 obesity due to excess calories with body mass index (BMI) of 37.0 to 37.9 in adult, unspecified whether serious comorbidity present  Chronic, BMI is 37, patient to schedule appointment for weight management    10. Edema, unspecified type  Chronic ongoing, finding during exam.  Discussed with the patient possibly related to dependent edema, differential diagnosis includes sleep apnea or lymphedema.    11. Sleep apnea-like behavior  Symptoms and signs of sleep apnea, patient reports a lot of snoring and stop breathing at night.  Tiredness during the day.  Will rule out sleep apnea, clinical exam with findings of lower extremity edema  - Referral to Pulmonary and Sleep Medicine    12.  Hand numbness  Chronic ongoing, pain on the left hand and forearm radiating to the arm and the upper back area.  Especially at night.  Rule out carpal tunnel  - Referral to Neurodiagnostics (EEG,EP,EMG/NCS/DBS)      Please note that this dictation was created using voice recognition software. I have made every reasonable attempt to correct obvious errors but there may be errors of grammar and content that I may have overlooked prior to finalization of this note.

## 2024-04-13 ENCOUNTER — HOSPITAL ENCOUNTER (OUTPATIENT)
Dept: LAB | Facility: MEDICAL CENTER | Age: 44
End: 2024-04-13
Attending: FAMILY MEDICINE
Payer: COMMERCIAL

## 2024-04-13 DIAGNOSIS — E74.39 GLUCOSE INTOLERANCE: ICD-10-CM

## 2024-04-13 DIAGNOSIS — E55.9 VITAMIN D DEFICIENCY: ICD-10-CM

## 2024-04-13 DIAGNOSIS — Z13.220 SCREENING FOR HYPERLIPIDEMIA: ICD-10-CM

## 2024-04-13 DIAGNOSIS — Z13.0 SCREENING FOR DEFICIENCY ANEMIA: ICD-10-CM

## 2024-04-13 DIAGNOSIS — E03.4 HYPOTHYROIDISM DUE TO ACQUIRED ATROPHY OF THYROID: ICD-10-CM

## 2024-04-13 LAB
25(OH)D3 SERPL-MCNC: 27 NG/ML (ref 30–100)
ALBUMIN SERPL BCP-MCNC: 4 G/DL (ref 3.2–4.9)
ALBUMIN/GLOB SERPL: 1.4 G/DL
ALP SERPL-CCNC: 58 U/L (ref 30–99)
ALT SERPL-CCNC: 13 U/L (ref 2–50)
ANION GAP SERPL CALC-SCNC: 11 MMOL/L (ref 7–16)
AST SERPL-CCNC: 19 U/L (ref 12–45)
BASOPHILS # BLD AUTO: 0.5 % (ref 0–1.8)
BASOPHILS # BLD: 0.04 K/UL (ref 0–0.12)
BILIRUB SERPL-MCNC: 0.6 MG/DL (ref 0.1–1.5)
BUN SERPL-MCNC: 9 MG/DL (ref 8–22)
CALCIUM ALBUM COR SERPL-MCNC: 9 MG/DL (ref 8.5–10.5)
CALCIUM SERPL-MCNC: 9 MG/DL (ref 8.5–10.5)
CHLORIDE SERPL-SCNC: 103 MMOL/L (ref 96–112)
CHOLEST SERPL-MCNC: 180 MG/DL (ref 100–199)
CO2 SERPL-SCNC: 23 MMOL/L (ref 20–33)
CREAT SERPL-MCNC: 0.57 MG/DL (ref 0.5–1.4)
EOSINOPHIL # BLD AUTO: 0.26 K/UL (ref 0–0.51)
EOSINOPHIL NFR BLD: 3.4 % (ref 0–6.9)
ERYTHROCYTE [DISTWIDTH] IN BLOOD BY AUTOMATED COUNT: 43.4 FL (ref 35.9–50)
EST. AVERAGE GLUCOSE BLD GHB EST-MCNC: 117 MG/DL
FASTING STATUS PATIENT QL REPORTED: NORMAL
GFR SERPLBLD CREATININE-BSD FMLA CKD-EPI: 115 ML/MIN/1.73 M 2
GLOBULIN SER CALC-MCNC: 2.9 G/DL (ref 1.9–3.5)
GLUCOSE SERPL-MCNC: 81 MG/DL (ref 65–99)
HBA1C MFR BLD: 5.7 % (ref 4–5.6)
HCT VFR BLD AUTO: 40.2 % (ref 37–47)
HDLC SERPL-MCNC: 47 MG/DL
HGB BLD-MCNC: 12.4 G/DL (ref 12–16)
IMM GRANULOCYTES # BLD AUTO: 0.02 K/UL (ref 0–0.11)
IMM GRANULOCYTES NFR BLD AUTO: 0.3 % (ref 0–0.9)
LDLC SERPL CALC-MCNC: 114 MG/DL
LYMPHOCYTES # BLD AUTO: 2.25 K/UL (ref 1–4.8)
LYMPHOCYTES NFR BLD: 29.1 % (ref 22–41)
MCH RBC QN AUTO: 26.7 PG (ref 27–33)
MCHC RBC AUTO-ENTMCNC: 30.8 G/DL (ref 32.2–35.5)
MCV RBC AUTO: 86.6 FL (ref 81.4–97.8)
MONOCYTES # BLD AUTO: 0.46 K/UL (ref 0–0.85)
MONOCYTES NFR BLD AUTO: 5.9 % (ref 0–13.4)
NEUTROPHILS # BLD AUTO: 4.71 K/UL (ref 1.82–7.42)
NEUTROPHILS NFR BLD: 60.8 % (ref 44–72)
NRBC # BLD AUTO: 0 K/UL
NRBC BLD-RTO: 0 /100 WBC (ref 0–0.2)
PLATELET # BLD AUTO: 261 K/UL (ref 164–446)
PMV BLD AUTO: 11.3 FL (ref 9–12.9)
POTASSIUM SERPL-SCNC: 4.4 MMOL/L (ref 3.6–5.5)
PROT SERPL-MCNC: 6.9 G/DL (ref 6–8.2)
RBC # BLD AUTO: 4.64 M/UL (ref 4.2–5.4)
SODIUM SERPL-SCNC: 137 MMOL/L (ref 135–145)
TRIGL SERPL-MCNC: 96 MG/DL (ref 0–149)
TSH SERPL DL<=0.005 MIU/L-ACNC: 3.04 UIU/ML (ref 0.38–5.33)
WBC # BLD AUTO: 7.7 K/UL (ref 4.8–10.8)

## 2024-04-13 PROCEDURE — 85025 COMPLETE CBC W/AUTO DIFF WBC: CPT

## 2024-04-13 PROCEDURE — 80061 LIPID PANEL: CPT

## 2024-04-13 PROCEDURE — 80053 COMPREHEN METABOLIC PANEL: CPT

## 2024-04-13 PROCEDURE — 82306 VITAMIN D 25 HYDROXY: CPT

## 2024-04-13 PROCEDURE — 84443 ASSAY THYROID STIM HORMONE: CPT

## 2024-04-13 PROCEDURE — 36415 COLL VENOUS BLD VENIPUNCTURE: CPT

## 2024-04-13 PROCEDURE — 83036 HEMOGLOBIN GLYCOSYLATED A1C: CPT

## 2024-04-19 ENCOUNTER — APPOINTMENT (OUTPATIENT)
Dept: RADIOLOGY | Facility: MEDICAL CENTER | Age: 44
End: 2024-04-19
Attending: FAMILY MEDICINE
Payer: COMMERCIAL

## 2024-04-19 DIAGNOSIS — Z12.31 ENCOUNTER FOR SCREENING MAMMOGRAM FOR MALIGNANT NEOPLASM OF BREAST: ICD-10-CM

## 2024-04-19 PROCEDURE — 77067 SCR MAMMO BI INCL CAD: CPT

## 2024-04-29 ENCOUNTER — OFFICE VISIT (OUTPATIENT)
Dept: MEDICAL GROUP | Facility: LAB | Age: 44
End: 2024-04-29
Payer: COMMERCIAL

## 2024-04-29 VITALS
OXYGEN SATURATION: 100 % | SYSTOLIC BLOOD PRESSURE: 114 MMHG | WEIGHT: 231 LBS | BODY MASS INDEX: 37.12 KG/M2 | RESPIRATION RATE: 16 BRPM | HEIGHT: 66 IN | DIASTOLIC BLOOD PRESSURE: 80 MMHG | TEMPERATURE: 97.5 F | HEART RATE: 69 BPM

## 2024-04-29 DIAGNOSIS — Z86.39 HISTORY OF THYROID DISEASE: ICD-10-CM

## 2024-04-29 DIAGNOSIS — R73.03 PREDIABETES: ICD-10-CM

## 2024-04-29 DIAGNOSIS — E78.5 DYSLIPIDEMIA: ICD-10-CM

## 2024-04-29 DIAGNOSIS — R79.89 LOW VITAMIN D LEVEL: ICD-10-CM

## 2024-04-29 RX ORDER — ERGOCALCIFEROL 1.25 MG/1
50000 CAPSULE ORAL
Qty: 12 CAPSULE | Refills: 0 | Status: SHIPPED | OUTPATIENT
Start: 2024-04-29

## 2024-04-29 ASSESSMENT — FIBROSIS 4 INDEX: FIB4 SCORE: 0.89

## 2024-04-29 NOTE — PROGRESS NOTES
"Chief Complaint   Patient presents with    Follow-Up    Lab Results       Verbal consent was acquired by the patient to use [employer] ambient listening note generation during this visit Yes      HPI: Established patient  History of Present Illness  The patient is a 43-year-old female who presents for a review of labs.      Low vitamin D:  Discussed subnormal levels of vitamin D.      The patient has been consistently taking vitamin D and B12 supplements for a considerable period and is inquiring about the necessity of additional supplements.    Dyslipidemia:  Mild elevation of her LDL level discussed, normal HDL.    Class II obesity:  She expresses interest in discussing dietary modifications. Despite inconsistent attempts at weight loss, she maintains a regular exercise regimen, primarily through Pilates. She has made dietary modifications, including eliminating rice and wheat from her diet and incorporating quinoa. Her diet primarily consists of vegetables, and she consumes eggs and occasional dairy for protein. She does not consume meat and adheres to a vegetarian diet. She maintains a high water intake.    BMI of 37.2 patient is  vegetarian.  Previous trial includes dietary restrictions and more exercise, not very successful.  Patient has no barriers to exercise or weight loss.  She drinks lots of water, exercises sometimes by walking.        During her previous visit, we reviewed her health maintenance. She has not yet scheduled her next Pap smear and has no history of abnormal Pap smears. She is an only sexual partner.               Exam:     /80 (BP Location: Right arm, Patient Position: Sitting, BP Cuff Size: Adult)   Pulse 69   Temp 36.4 °C (97.5 °F) (Temporal)   Resp 16   Ht 1.676 m (5' 6\")   Wt 105 kg (231 lb)   LMP 04/22/2024   SpO2 100%   BMI 37.28 kg/m²   Gen.: Well-developed, well-nourished, no apparent distress, pleasant and cooperative with the examination  HEENT: " Normocephalic/atraumatic,        Assessment & Plan      1. Low vitamin D level  A prescription for vitamin D 50,000 units, to be taken every 7 days, will be provided. The patient will discontinue over-the-counter vitamin D and continue with the prescription dose.  - ergocalciferol (DRISDOL) 80958 UNIT capsule; Take 1 Capsule by mouth every 7 days.  Dispense: 12 Capsule; Refill: 0    2. Dyslipidemia  The patient's total cholesterol level is mildly elevated, with the target level below 100. The patient was advised to monitor her diet and engage in weight loss.      3. History of thyroid disease    - TSH+FREE T4    4. Prediabetes  The patient's blood glucose levels are elevated, indicative of prediabetes, with the target level below 5.6. The patient was counseled on the importance of weight reduction, regular exercise, and a healthy lifestyle.        4.  History of thyroid disease.  The patient's thyroid level is within the normal range at 3.0. The patient will persist with her thyroid medication. A repeat thyroid level test will be conducted in 3 months.    5. Health maintenance.  The patient is at low risk for her Pap smear. The patient was advised to schedule a Pap smear with me.    6. Class 2 obesity.  The patient's BMI is currently 37.2, placing her in class 2 obesity. The patient was advised to maintain a daily caloric intake of 1500 calories. Dietary recommendations include a high-protein diet and a daily intake of 70 g of protein. Exercise for 150 minutes per week was recommended, with a daily water intake of 64 ounces.    Follow-up  The patient is scheduled for a follow-up visit in 4 weeks for a weight check.

## 2024-05-03 ENCOUNTER — TELEPHONE (OUTPATIENT)
Dept: HEALTH INFORMATION MANAGEMENT | Facility: OTHER | Age: 44
End: 2024-05-03
Payer: COMMERCIAL

## 2024-05-26 DIAGNOSIS — E03.4 HYPOTHYROIDISM DUE TO ACQUIRED ATROPHY OF THYROID: ICD-10-CM

## 2024-05-28 NOTE — TELEPHONE ENCOUNTER
Received request via: Patient    Was the patient seen in the last year in this department? Yes    Does the patient have an active prescription (recently filled or refills available) for medication(s) requested? No    [facility]    Does the patient have correction Plus and need 100 day supply (blood pressure, diabetes and cholesterol meds only)? Patient does not have SCP

## 2024-05-29 RX ORDER — LEVOTHYROXINE SODIUM 0.07 MG/1
TABLET ORAL
Qty: 90 TABLET | Refills: 1 | Status: SHIPPED | OUTPATIENT
Start: 2024-05-29

## 2024-07-03 ENCOUNTER — DOCUMENTATION (OUTPATIENT)
Dept: HEALTH INFORMATION MANAGEMENT | Facility: OTHER | Age: 44
End: 2024-07-03
Payer: COMMERCIAL

## 2024-07-09 ENCOUNTER — NON-PROVIDER VISIT (OUTPATIENT)
Dept: NEUROLOGY | Facility: MEDICAL CENTER | Age: 44
End: 2024-07-09
Attending: SPECIALIST
Payer: COMMERCIAL

## 2024-07-09 DIAGNOSIS — R20.0 HAND NUMBNESS: ICD-10-CM

## 2024-07-09 PROCEDURE — 95908 NRV CNDJ TST 3-4 STUDIES: CPT | Mod: 26 | Performed by: SPECIALIST

## 2024-07-09 PROCEDURE — 95886 MUSC TEST DONE W/N TEST COMP: CPT | Mod: 26,LT | Performed by: SPECIALIST

## 2024-07-09 PROCEDURE — 95908 NRV CNDJ TST 3-4 STUDIES: CPT | Performed by: SPECIALIST

## 2024-07-09 PROCEDURE — 95886 MUSC TEST DONE W/N TEST COMP: CPT | Performed by: SPECIALIST

## 2024-07-25 ENCOUNTER — APPOINTMENT (OUTPATIENT)
Dept: MEDICAL GROUP | Facility: PHYSICIAN GROUP | Age: 44
End: 2024-07-25
Payer: COMMERCIAL

## 2024-08-12 ENCOUNTER — TELEPHONE (OUTPATIENT)
Dept: HEALTH INFORMATION MANAGEMENT | Facility: OTHER | Age: 44
End: 2024-08-12

## 2024-10-02 ENCOUNTER — OFFICE VISIT (OUTPATIENT)
Dept: URGENT CARE | Facility: CLINIC | Age: 44
End: 2024-10-02
Payer: COMMERCIAL

## 2024-10-02 VITALS
RESPIRATION RATE: 18 BRPM | HEIGHT: 66 IN | WEIGHT: 227 LBS | TEMPERATURE: 97.6 F | DIASTOLIC BLOOD PRESSURE: 76 MMHG | BODY MASS INDEX: 36.48 KG/M2 | OXYGEN SATURATION: 100 % | SYSTOLIC BLOOD PRESSURE: 118 MMHG | HEART RATE: 86 BPM

## 2024-10-02 DIAGNOSIS — U07.1 COVID-19: ICD-10-CM

## 2024-10-02 PROCEDURE — 99213 OFFICE O/P EST LOW 20 MIN: CPT | Performed by: PHYSICIAN ASSISTANT

## 2024-10-02 PROCEDURE — 3074F SYST BP LT 130 MM HG: CPT | Performed by: PHYSICIAN ASSISTANT

## 2024-10-02 PROCEDURE — 3078F DIAST BP <80 MM HG: CPT | Performed by: PHYSICIAN ASSISTANT

## 2024-10-02 ASSESSMENT — FIBROSIS 4 INDEX: FIB4 SCORE: 0.89

## 2024-10-14 ENCOUNTER — APPOINTMENT (OUTPATIENT)
Dept: SLEEP MEDICINE | Facility: MEDICAL CENTER | Age: 44
End: 2024-10-14
Attending: FAMILY MEDICINE
Payer: COMMERCIAL

## 2024-11-25 ENCOUNTER — HOSPITAL ENCOUNTER (OUTPATIENT)
Dept: LAB | Facility: MEDICAL CENTER | Age: 44
End: 2024-11-25
Attending: NURSE PRACTITIONER
Payer: COMMERCIAL

## 2024-11-25 ENCOUNTER — OFFICE VISIT (OUTPATIENT)
Dept: MEDICAL GROUP | Facility: LAB | Age: 44
End: 2024-11-25
Payer: COMMERCIAL

## 2024-11-25 VITALS
RESPIRATION RATE: 14 BRPM | OXYGEN SATURATION: 100 % | DIASTOLIC BLOOD PRESSURE: 64 MMHG | TEMPERATURE: 97.2 F | HEART RATE: 80 BPM | SYSTOLIC BLOOD PRESSURE: 114 MMHG | HEIGHT: 66 IN | BODY MASS INDEX: 37.1 KG/M2 | WEIGHT: 230.85 LBS

## 2024-11-25 DIAGNOSIS — R73.03 PREDIABETES: ICD-10-CM

## 2024-11-25 DIAGNOSIS — E66.09 CLASS 2 OBESITY DUE TO EXCESS CALORIES WITH BODY MASS INDEX (BMI) OF 37.0 TO 37.9 IN ADULT, UNSPECIFIED WHETHER SERIOUS COMORBIDITY PRESENT: ICD-10-CM

## 2024-11-25 DIAGNOSIS — Z86.39 HISTORY OF THYROID DISORDER: ICD-10-CM

## 2024-11-25 DIAGNOSIS — E66.812 CLASS 2 OBESITY DUE TO EXCESS CALORIES WITH BODY MASS INDEX (BMI) OF 37.0 TO 37.9 IN ADULT, UNSPECIFIED WHETHER SERIOUS COMORBIDITY PRESENT: ICD-10-CM

## 2024-11-25 DIAGNOSIS — L68.0 HIRSUTISM: ICD-10-CM

## 2024-11-25 DIAGNOSIS — M79.671 FOOT PAIN, BILATERAL: ICD-10-CM

## 2024-11-25 DIAGNOSIS — M79.672 FOOT PAIN, BILATERAL: ICD-10-CM

## 2024-11-25 DIAGNOSIS — M25.532 LEFT WRIST PAIN: ICD-10-CM

## 2024-11-25 LAB
25(OH)D3 SERPL-MCNC: 26 NG/ML (ref 30–100)
ALBUMIN SERPL BCP-MCNC: 3.8 G/DL (ref 3.2–4.9)
ALBUMIN/GLOB SERPL: 1.2 G/DL
ALP SERPL-CCNC: 67 U/L (ref 30–99)
ALT SERPL-CCNC: 13 U/L (ref 2–50)
ANION GAP SERPL CALC-SCNC: 10 MMOL/L (ref 7–16)
AST SERPL-CCNC: 14 U/L (ref 12–45)
BILIRUB SERPL-MCNC: 1 MG/DL (ref 0.1–1.5)
BUN SERPL-MCNC: 8 MG/DL (ref 8–22)
CALCIUM ALBUM COR SERPL-MCNC: 9 MG/DL (ref 8.5–10.5)
CALCIUM SERPL-MCNC: 8.8 MG/DL (ref 8.5–10.5)
CHLORIDE SERPL-SCNC: 103 MMOL/L (ref 96–112)
CHOLEST SERPL-MCNC: 192 MG/DL (ref 100–199)
CO2 SERPL-SCNC: 24 MMOL/L (ref 20–33)
CREAT SERPL-MCNC: 0.57 MG/DL (ref 0.5–1.4)
ERYTHROCYTE [DISTWIDTH] IN BLOOD BY AUTOMATED COUNT: 43.7 FL (ref 35.9–50)
EST. AVERAGE GLUCOSE BLD GHB EST-MCNC: 117 MG/DL
FSH SERPL-ACNC: 3.7 MIU/ML
GFR SERPLBLD CREATININE-BSD FMLA CKD-EPI: 114 ML/MIN/1.73 M 2
GLOBULIN SER CALC-MCNC: 3.2 G/DL (ref 1.9–3.5)
GLUCOSE SERPL-MCNC: 83 MG/DL (ref 65–99)
HBA1C MFR BLD: 5.7 % (ref 4–5.6)
HCT VFR BLD AUTO: 39.2 % (ref 37–47)
HDLC SERPL-MCNC: 52 MG/DL
HGB BLD-MCNC: 12.4 G/DL (ref 12–16)
LDLC SERPL CALC-MCNC: 122 MG/DL
LH SERPL-ACNC: 4.5 IU/L
MCH RBC QN AUTO: 27.1 PG (ref 27–33)
MCHC RBC AUTO-ENTMCNC: 31.6 G/DL (ref 32.2–35.5)
MCV RBC AUTO: 85.6 FL (ref 81.4–97.8)
PLATELET # BLD AUTO: 271 K/UL (ref 164–446)
PMV BLD AUTO: 11 FL (ref 9–12.9)
POTASSIUM SERPL-SCNC: 4.6 MMOL/L (ref 3.6–5.5)
PROGEST SERPL-MCNC: 3.32 NG/ML
PROT SERPL-MCNC: 7 G/DL (ref 6–8.2)
RBC # BLD AUTO: 4.58 M/UL (ref 4.2–5.4)
SODIUM SERPL-SCNC: 137 MMOL/L (ref 135–145)
T4 FREE SERPL-MCNC: 0.98 NG/DL (ref 0.93–1.7)
TRIGL SERPL-MCNC: 90 MG/DL (ref 0–149)
TSH SERPL-ACNC: 6.47 UIU/ML (ref 0.35–5.5)
WBC # BLD AUTO: 8.4 K/UL (ref 4.8–10.8)

## 2024-11-25 PROCEDURE — 80053 COMPREHEN METABOLIC PANEL: CPT

## 2024-11-25 PROCEDURE — 84144 ASSAY OF PROGESTERONE: CPT

## 2024-11-25 PROCEDURE — 84403 ASSAY OF TOTAL TESTOSTERONE: CPT

## 2024-11-25 PROCEDURE — 84443 ASSAY THYROID STIM HORMONE: CPT

## 2024-11-25 PROCEDURE — 36415 COLL VENOUS BLD VENIPUNCTURE: CPT

## 2024-11-25 PROCEDURE — 83036 HEMOGLOBIN GLYCOSYLATED A1C: CPT

## 2024-11-25 PROCEDURE — 83002 ASSAY OF GONADOTROPIN (LH): CPT

## 2024-11-25 PROCEDURE — 80061 LIPID PANEL: CPT

## 2024-11-25 PROCEDURE — 84402 ASSAY OF FREE TESTOSTERONE: CPT

## 2024-11-25 PROCEDURE — 82306 VITAMIN D 25 HYDROXY: CPT

## 2024-11-25 PROCEDURE — 84270 ASSAY OF SEX HORMONE GLOBUL: CPT

## 2024-11-25 PROCEDURE — 85027 COMPLETE CBC AUTOMATED: CPT

## 2024-11-25 PROCEDURE — 83001 ASSAY OF GONADOTROPIN (FSH): CPT

## 2024-11-25 PROCEDURE — 99214 OFFICE O/P EST MOD 30 MIN: CPT | Performed by: NURSE PRACTITIONER

## 2024-11-25 PROCEDURE — 84439 ASSAY OF FREE THYROXINE: CPT

## 2024-11-25 PROCEDURE — 82671 ASSAY OF ESTROGENS: CPT

## 2024-11-25 PROCEDURE — 3078F DIAST BP <80 MM HG: CPT | Performed by: NURSE PRACTITIONER

## 2024-11-25 PROCEDURE — 3074F SYST BP LT 130 MM HG: CPT | Performed by: NURSE PRACTITIONER

## 2024-11-25 ASSESSMENT — FIBROSIS 4 INDEX: FIB4 SCORE: 0.91

## 2024-11-25 NOTE — ASSESSMENT & PLAN NOTE
A1c history:   Lab Results   Component Value Date/Time    HBA1C 5.7 (H) 04/13/2024 08:47 AM    HBA1C 5.8 (H) 10/21/2021 05:27 AM    HBA1C 5.5 02/24/2021 06:59 AM    HBA1C 5.9 (H) 06/30/2020 11:28 AM    HBA1C 5.9 (H) 04/23/2019 08:50 AM     Currently working on lifestyle modifications including diet and exercise. Denies any unexplained weight loss, polyuria, polydipsia.

## 2024-11-25 NOTE — ASSESSMENT & PLAN NOTE
Reports that she was previously on thyroid medication, but her thyroid function returned to normal. Has not been on medication since 4/2024. Due for labs. She does notice some hair loss and hirsutism. Also reporting some weight gain.

## 2024-11-25 NOTE — ASSESSMENT & PLAN NOTE
"At visit height 5'6\" and weight 230 pounds yielding BMI 37.26.   Educated on normal BMI. Denies weight changes. Would like to lose weight - no specific plan. Educated on lifestyle changes/portion control/plant based diet/adequate hydration.  May be beneficial to use food tracker gina/food journal.  Consider dietician evaluation/weight management program. Patient agrees that dietician/weight management program may be beneficial.  Comorbid conditions: prediabetes  "

## 2024-11-25 NOTE — PROGRESS NOTES
"Subjective:     CC:    Chief Complaint   Patient presents with    Establish Care    Medication Refill    Body Aches     Pains      HISTORY OF THE PRESENT ILLNESS: Patient is a 44 y.o. female. This pleasant patient is here today to establish care and discuss the following:    Prediabetes  A1c history:   Lab Results   Component Value Date/Time    HBA1C 5.7 (H) 04/13/2024 08:47 AM    HBA1C 5.8 (H) 10/21/2021 05:27 AM    HBA1C 5.5 02/24/2021 06:59 AM    HBA1C 5.9 (H) 06/30/2020 11:28 AM    HBA1C 5.9 (H) 04/23/2019 08:50 AM     Currently working on lifestyle modifications including diet and exercise. Denies any unexplained weight loss, polyuria, polydipsia.     Obesity  At visit height 5'6\" and weight 230 pounds yielding BMI 37.26.   Educated on normal BMI. Denies weight changes. Would like to lose weight - no specific plan. Educated on lifestyle changes/portion control/plant based diet/adequate hydration.  May be beneficial to use food tracker gina/food journal.  Consider dietician evaluation/weight management program. Patient agrees that dietician/weight management program may be beneficial.  Comorbid conditions: prediabetes    History of thyroid disorder  Reports that she was previously on thyroid medication, but her thyroid function returned to normal. Has not been on medication since 4/2024. Due for labs. She does notice some hair loss and hirsutism. Also reporting some weight gain.    Hirsutism  Reports hair growth on her chin in places that she has not noticed it before. Also having some irregular periods, they are coming about a week earlier than normal. Periods are regular. Reports some weight gain despite diet and exercise. She is interested in checking hormone levels.    Foot pain  Reports that she has been walking on the outside of her feet and this is causing her pain. She is interested in a referral to podiatry to discuss orthotics.     Left wrist pain  Patient reports this is a chronic pain, she uses " "computer a lot during her work. She has pain and numbness in the left hand, radiating to the left forearm and arm area and upper back. This has been going on for a long time. Associated with numbness and tingling sensation especially at night     ROS:   Gen: no fevers/chills, no changes in weight  Eyes: no changes in vision  ENT: no sore throat, no hearing loss, no bloody nose  Pulm: no sob, no cough  CV: no chest pain, no palpitations  GI: no nausea/vomiting, no diarrhea  : no dysuria  MSk: no myalgias  Skin: no rash  Neuro: no headaches, no numbness/tingling  Heme/Lymph: no easy bruising        - NOTE: All other systems reviewed and are negative, except as in HPI.      Objective:     Exam: /64 (BP Location: Right arm, Patient Position: Sitting, BP Cuff Size: Large adult)   Pulse 80   Temp 36.2 °C (97.2 °F)   Resp 14   Ht 1.676 m (5' 6\")   Wt 105 kg (230 lb 13.6 oz)   SpO2 100%  Body mass index is 37.26 kg/m².    Constitutional: Alert, no distress, well-groomed.  Skin: Warm, dry, good turgor, no rashes in visible areas.  Eye: Equal, round and reactive, conjunctiva clear, lids normal.  ENMT: Lips without lesions, good dentition, moist mucous membranes.  Neck: Trachea midline, no masses, no thyromegaly.  Respiratory: Unlabored respiratory effort, no cough.  MSK: Normal gait, moves all extremities.  Neuro: Grossly non-focal.   Psych: Alert and oriented x3, normal affect and mood.    Assessment & Plan:   44 y.o. female with the following -    1. Left wrist pain  Chronic condition. Referral placed to orthopedics.   - Referral to Orthopedics    2. Prediabetes  Chronic condition. Recommend to reduce sugar/carbohydrate/alcohol, eat more vegetables and lean meats such as fish/chicken/turkey. Recommend 30 minutes of cardiovascular exercise most days of the week.  - Comp Metabolic Panel; Future  - CBC WITHOUT DIFFERENTIAL; Future  - HEMOGLOBIN A1C; Future    3. Class 2 obesity due to excess calories with body " mass index (BMI) of 37.0 to 37.9 in adult, unspecified whether serious comorbidity present  BMI was assessed today and reviewed with patient as meeting criteria for the risk factor obesity.  Willingness to change as well as barriers were assessed. A collaborative plan was made with the patient and goals were set. Assistance was discussed, including self-help and more formal medical adjunctive treatments. Recommend healthy low-carb diet, 30-minutes of moderate exercise daily and avoiding sugars and high-fat foods.    - Comp Metabolic Panel; Future  - CBC WITHOUT DIFFERENTIAL; Future  - Lipid Profile; Future  - HEMOGLOBIN A1C; Future  - TSH; Future  - FREE THYROXINE; Future  - VITAMIN D,25 HYDROXY (DEFICIENCY); Future  - ESTROGEN TOTAL; Future  - FSH/LH; Future  - PROGESTERONE; Future  - TESTOSTERONE F&T FEMALES/CHILD; Future    4. Hirsutism  Labs ordered.   - ESTROGEN TOTAL; Future  - FSH/LH; Future  - PROGESTERONE; Future  - TESTOSTERONE F&T FEMALES/CHILD; Future    5. Foot pain, bilateral  Referral placed to podiatry.   - Referral to Podiatry    6. History of thyroid disorder  Labs ordered.   - TSH; Future  - FREE THYROXINE; Future

## 2024-11-27 ENCOUNTER — OFFICE VISIT (OUTPATIENT)
Dept: MEDICAL GROUP | Facility: LAB | Age: 44
End: 2024-11-27
Payer: COMMERCIAL

## 2024-11-27 VITALS
TEMPERATURE: 96.9 F | HEIGHT: 66 IN | OXYGEN SATURATION: 100 % | BODY MASS INDEX: 37.61 KG/M2 | SYSTOLIC BLOOD PRESSURE: 116 MMHG | DIASTOLIC BLOOD PRESSURE: 64 MMHG | HEART RATE: 96 BPM | WEIGHT: 234 LBS | RESPIRATION RATE: 14 BRPM

## 2024-11-27 DIAGNOSIS — R79.89 LOW VITAMIN D LEVEL: ICD-10-CM

## 2024-11-27 DIAGNOSIS — E03.8 SUBCLINICAL HYPOTHYROIDISM: ICD-10-CM

## 2024-11-27 DIAGNOSIS — R73.03 PREDIABETES: ICD-10-CM

## 2024-11-27 DIAGNOSIS — E78.00 PURE HYPERCHOLESTEROLEMIA: ICD-10-CM

## 2024-11-27 PROCEDURE — 3078F DIAST BP <80 MM HG: CPT | Performed by: NURSE PRACTITIONER

## 2024-11-27 PROCEDURE — 99214 OFFICE O/P EST MOD 30 MIN: CPT | Performed by: NURSE PRACTITIONER

## 2024-11-27 PROCEDURE — 3074F SYST BP LT 130 MM HG: CPT | Performed by: NURSE PRACTITIONER

## 2024-11-27 RX ORDER — ERGOCALCIFEROL 1.25 MG/1
50000 CAPSULE ORAL
Qty: 12 CAPSULE | Refills: 0 | Status: SHIPPED | OUTPATIENT
Start: 2024-11-27

## 2024-11-27 RX ORDER — LEVOTHYROXINE SODIUM 25 UG/1
25 TABLET ORAL
Qty: 90 TABLET | Refills: 3 | Status: SHIPPED | OUTPATIENT
Start: 2024-11-27

## 2024-11-27 ASSESSMENT — FIBROSIS 4 INDEX: FIB4 SCORE: 0.64

## 2024-11-27 NOTE — ASSESSMENT & PLAN NOTE
Chronic condition. Reviewed labs. Patient currently managing with lifestyle modifications. The 10-year ASCVD risk score (Jam VIDAL, et al., 2019) is: 0.7%.  Denies chest pain, shortness of breath, heart palpitations.    Lab Results   Component Value Date/Time    CHOLSTRLTOT 192 11/25/2024 08:06 AM     (H) 11/25/2024 08:06 AM    HDL 52 11/25/2024 08:06 AM    TRIGLYCERIDE 90 11/25/2024 08:06 AM

## 2024-11-27 NOTE — ASSESSMENT & PLAN NOTE
Labs 11/25/2024:  Component      Latest Ref Rng 11/25/2024   Free T-4      0.93 - 1.70 ng/dL 0.98    TSH      0.350 - 5.500 uIU/mL 6.470 (H)       Legend:  (H) High    She does notice some hair loss and hirsutism. Also reporting some weight gain.

## 2024-11-27 NOTE — ASSESSMENT & PLAN NOTE
A1c history:   Lab Results   Component Value Date/Time    HBA1C 5.7 (H) 11/25/2024 08:06 AM    HBA1C 5.7 (H) 04/13/2024 08:47 AM    HBA1C 5.8 (H) 10/21/2021 05:27 AM    HBA1C 5.5 02/24/2021 06:59 AM    HBA1C 5.9 (H) 06/30/2020 11:28 AM     Currently working on lifestyle modifications including diet and exercise. Denies any unexplained weight loss, polyuria, polydipsia.

## 2024-11-27 NOTE — PROGRESS NOTES
"Subjective:     CC:   Chief Complaint   Patient presents with    Lab Results     HPI:   Asmita presents today with the following:    Prediabetes  A1c history:   Lab Results   Component Value Date/Time    HBA1C 5.7 (H) 11/25/2024 08:06 AM    HBA1C 5.7 (H) 04/13/2024 08:47 AM    HBA1C 5.8 (H) 10/21/2021 05:27 AM    HBA1C 5.5 02/24/2021 06:59 AM    HBA1C 5.9 (H) 06/30/2020 11:28 AM     Currently working on lifestyle modifications including diet and exercise. Denies any unexplained weight loss, polyuria, polydipsia.     Subclinical hypothyroidism  Labs 11/25/2024:  Component      Latest Ref Rng 11/25/2024   Free T-4      0.93 - 1.70 ng/dL 0.98    TSH      0.350 - 5.500 uIU/mL 6.470 (H)       Legend:  (H) High    She does notice some hair loss and hirsutism. Also reporting some weight gain.     Pure hypercholesterolemia  Chronic condition. Reviewed labs. Patient currently managing with lifestyle modifications. The 10-year ASCVD risk score (Jam VIDAL, et al., 2019) is: 0.7%.  Denies chest pain, shortness of breath, heart palpitations.    Lab Results   Component Value Date/Time    CHOLSTRLTOT 192 11/25/2024 08:06 AM     (H) 11/25/2024 08:06 AM    HDL 52 11/25/2024 08:06 AM    TRIGLYCERIDE 90 11/25/2024 08:06 AM         ROS:   Gen: no fevers/chills, no changes in weight  Eyes: no changes in vision  ENT: no sore throat, no hearing loss, no bloody nose  Pulm: no sob, no cough  CV: no chest pain, no palpitations  GI: no nausea/vomiting, no diarrhea  : no dysuria  MSk: no myalgias  Skin: no rash  Neuro: no headaches, no numbness/tingling  Heme/Lymph: no easy bruising        - NOTE: All other systems reviewed and are negative, except as in HPI.    Objective:     Exam: /64 (BP Location: Right arm, Patient Position: Sitting, BP Cuff Size: Large adult)   Pulse 96   Temp 36.1 °C (96.9 °F)   Resp 14   Ht 1.676 m (5' 6\")   Wt 106 kg (234 lb)   SpO2 100%  Body mass index is 37.77 kg/m².    Constitutional: Alert, no " distress, well-groomed.  Skin: Warm, dry, good turgor, no rashes in visible areas.  Eye: Equal, round and reactive, conjunctiva clear, lids normal.  ENMT: Lips without lesions, good dentition, moist mucous membranes.  Neck: Trachea midline, no masses, no thyromegaly.  Respiratory: Unlabored respiratory effort, no cough.  MSK: Normal gait, moves all extremities.  Neuro: Grossly non-focal.   Psych: Alert and oriented x3, normal affect and mood.    Assessment & Plan:     44 y.o. female with the following -     1. Subclinical hypothyroidism  Patient to take medication as prescribed. Side effects of medication prescribed today were discussed with the patient including how to take the medication and proper dosage. Discussed repercussions of not taking the medication as prescribed. Instructed to call the office should she have any negative side effects or problems with the medication. Take on empty stomach with glass of water, 30 minutes prior to food or other medications.  Labs as indicated.  - levothyroxine (SYNTHROID) 25 MCG Tab; Take 1 Tablet by mouth every morning on an empty stomach.  Dispense: 90 Tablet; Refill: 3  - TSH; Future  - FREE THYROXINE; Future    2. Prediabetes  Chronic condition. Recommend to reduce sugar/carbohydrate/alcohol, eat more vegetables and lean meats such as fish/chicken/turkey. Recommend 30 minutes of cardiovascular exercise most days of the week.    3. Low vitamin D level  Chronic condition. Patient to take medication as prescribed.   - ergocalciferol (DRISDOL) 10803 UNIT capsule; Take 1 Capsule by mouth every 7 days.  Dispense: 12 Capsule; Refill: 0

## 2024-11-29 LAB
ESTRADIOL SERPL HS-MCNC: 56 PG/ML
ESTROGEN SERPL CALC-MCNC: 110.4 PG/ML
ESTRONE SERPL-MCNC: 54.4 PG/ML
SHBG SERPL-SCNC: 34 NMOL/L (ref 25–122)
TESTOST FREE SERPL-MCNC: 2.6 PG/ML (ref 1.1–5.8)
TESTOST SERPL-MCNC: 16 NG/DL (ref 9–55)

## 2025-08-13 ENCOUNTER — APPOINTMENT (OUTPATIENT)
Dept: MEDICAL GROUP | Facility: LAB | Age: 45
End: 2025-08-13
Payer: COMMERCIAL

## 2025-08-13 ENCOUNTER — APPOINTMENT (OUTPATIENT)
Dept: URGENT CARE | Facility: CLINIC | Age: 45
End: 2025-08-13
Payer: COMMERCIAL

## 2025-08-13 ENCOUNTER — APPOINTMENT (OUTPATIENT)
Dept: MEDICAL GROUP | Facility: PHYSICIAN GROUP | Age: 45
End: 2025-08-13
Payer: COMMERCIAL

## 2025-08-27 ENCOUNTER — HOSPITAL ENCOUNTER (OUTPATIENT)
Dept: LAB | Facility: MEDICAL CENTER | Age: 45
End: 2025-08-27
Attending: NURSE PRACTITIONER
Payer: COMMERCIAL

## 2025-08-27 DIAGNOSIS — E03.8 SUBCLINICAL HYPOTHYROIDISM: ICD-10-CM

## 2025-08-27 LAB
T4 FREE SERPL-MCNC: 0.85 NG/DL (ref 0.93–1.7)
TSH SERPL-ACNC: 5.64 UIU/ML (ref 0.38–5.33)

## 2025-08-27 PROCEDURE — 84439 ASSAY OF FREE THYROXINE: CPT

## 2025-08-27 PROCEDURE — 36415 COLL VENOUS BLD VENIPUNCTURE: CPT

## 2025-08-27 PROCEDURE — 84443 ASSAY THYROID STIM HORMONE: CPT

## 2025-09-02 ENCOUNTER — APPOINTMENT (OUTPATIENT)
Dept: MEDICAL GROUP | Facility: LAB | Age: 45
End: 2025-09-02
Payer: COMMERCIAL